# Patient Record
Sex: FEMALE | Race: ASIAN | NOT HISPANIC OR LATINO
[De-identification: names, ages, dates, MRNs, and addresses within clinical notes are randomized per-mention and may not be internally consistent; named-entity substitution may affect disease eponyms.]

---

## 2017-11-21 PROCEDURE — 87517 HEPATITIS B DNA QUANT: CPT | Performed by: INTERNAL MEDICINE

## 2017-11-24 PROCEDURE — 87177 OVA AND PARASITES SMEARS: CPT | Performed by: INTERNAL MEDICINE

## 2017-11-24 PROCEDURE — 87272 CRYPTOSPORIDIUM AG IF: CPT | Performed by: INTERNAL MEDICINE

## 2017-11-24 PROCEDURE — 87045 FECES CULTURE AEROBIC BACT: CPT | Performed by: INTERNAL MEDICINE

## 2017-11-24 PROCEDURE — 87209 SMEAR COMPLEX STAIN: CPT | Performed by: INTERNAL MEDICINE

## 2017-11-24 PROCEDURE — 87046 STOOL CULTR AEROBIC BACT EA: CPT | Performed by: INTERNAL MEDICINE

## 2017-11-24 PROCEDURE — 87329 GIARDIA AG IA: CPT | Performed by: INTERNAL MEDICINE

## 2017-11-24 PROCEDURE — 87427 SHIGA-LIKE TOXIN AG IA: CPT | Performed by: INTERNAL MEDICINE

## 2017-12-15 ENCOUNTER — BH HISTORICAL (OUTPATIENT)
Dept: OTHER | Age: 82
End: 2017-12-15

## 2018-01-11 ENCOUNTER — BH HISTORICAL (OUTPATIENT)
Dept: OTHER | Age: 83
End: 2018-01-11

## 2018-01-30 ENCOUNTER — HOSPITAL (OUTPATIENT)
Dept: OTHER | Age: 83
End: 2018-01-30
Attending: INTERNAL MEDICINE

## 2018-02-10 ENCOUNTER — BH HISTORICAL (OUTPATIENT)
Dept: OTHER | Age: 83
End: 2018-02-10

## 2018-02-21 ENCOUNTER — BH HISTORICAL (OUTPATIENT)
Dept: OTHER | Age: 83
End: 2018-02-21

## 2018-05-07 ENCOUNTER — BH HISTORICAL (OUTPATIENT)
Dept: OTHER | Age: 83
End: 2018-05-07

## 2018-05-10 ENCOUNTER — BH HISTORICAL (OUTPATIENT)
Dept: OTHER | Age: 83
End: 2018-05-10

## 2018-07-02 ENCOUNTER — BH HISTORICAL (OUTPATIENT)
Dept: OTHER | Age: 83
End: 2018-07-02

## 2018-08-03 ENCOUNTER — DIAGNOSTIC TRANS (OUTPATIENT)
Dept: OTHER | Age: 83
End: 2018-08-03

## 2018-08-03 ENCOUNTER — HOSPITAL (OUTPATIENT)
Dept: OTHER | Age: 83
End: 2018-08-03
Attending: HOSPITALIST

## 2018-08-03 ENCOUNTER — HOSPITAL ENCOUNTER (INPATIENT)
Facility: HOSPITAL | Age: 83
LOS: 4 days | Discharge: NURSING, REHAB OR LONG-TERM CARE | DRG: 470 | End: 2018-08-07
Attending: HOSPITALIST | Admitting: HOSPITALIST
Payer: MEDICARE

## 2018-08-03 DIAGNOSIS — S72.009A HIP FRACTURE (HCC): ICD-10-CM

## 2018-08-03 LAB
AMORPH SED URNS QL MICRO: ABNORMAL
ANALYZER ANC (IANC): NORMAL
ANION GAP SERPL CALC-SCNC: 15 MMOL/L (ref 10–20)
APPEARANCE UR: CLEAR
APTT PPP: 24 SECONDS (ref 22–30)
APTT PPP: NORMAL S
BACTERIA #/AREA URNS HPF: ABNORMAL /HPF
BASOPHILS # BLD: 0 THOUSAND/MCL (ref 0–0.3)
BASOPHILS NFR BLD: 0 %
BILIRUB UR QL: NEGATIVE
BUN SERPL-MCNC: 17 MG/DL (ref 6–20)
BUN/CREAT SERPL: 20 (ref 7–25)
CALCIUM SERPL-MCNC: 8.5 MG/DL (ref 8.4–10.2)
CAOX CRY URNS QL MICRO: ABNORMAL
CHLORIDE: 105 MMOL/L (ref 98–107)
CO2 SERPL-SCNC: 25 MMOL/L (ref 21–32)
COLOR UR: YELLOW
CREAT SERPL-MCNC: 0.87 MG/DL (ref 0.51–0.95)
DIFFERENTIAL METHOD BLD: NORMAL
EOSINOPHIL # BLD: 0.1 THOUSAND/MCL (ref 0.1–0.5)
EOSINOPHIL NFR BLD: 1 %
EPITH CASTS #/AREA URNS LPF: ABNORMAL /[LPF]
ERYTHROCYTE [DISTWIDTH] IN BLOOD: 13.1 % (ref 11–15)
FATTY CASTS #/AREA URNS LPF: ABNORMAL /[LPF]
GLUCOSE SERPL-MCNC: 106 MG/DL (ref 65–99)
GLUCOSE UR-MCNC: NEGATIVE MG/DL
GRAN CASTS #/AREA URNS LPF: ABNORMAL /[LPF]
HEMATOCRIT: 44.2 % (ref 36–46.5)
HGB BLD-MCNC: 14.9 GM/DL (ref 12–15.5)
HGB UR QL: NEGATIVE
HYALINE CASTS #/AREA URNS LPF: ABNORMAL /LPF (ref 0–5)
INR PPP: 1
KETONES UR-MCNC: NEGATIVE MG/DL
LEUKOCYTE ESTERASE UR QL STRIP: NEGATIVE
LYMPHOCYTES # BLD: 1.2 THOUSAND/MCL (ref 1–4)
LYMPHOCYTES NFR BLD: 13 %
MAGNESIUM SERPL-MCNC: 2.2 MG/DL (ref 1.7–2.4)
MCH RBC QN AUTO: 30.3 PG (ref 26–34)
MCHC RBC AUTO-ENTMCNC: 33.7 GM/DL (ref 32–36.5)
MCV RBC AUTO: 90 FL (ref 78–100)
MIXED CELL CASTS #/AREA URNS LPF: ABNORMAL /[LPF]
MONOCYTES # BLD: 0.5 THOUSAND/MCL (ref 0.3–0.9)
MONOCYTES NFR BLD: 5 %
MUCOUS THREADS URNS QL MICRO: PRESENT
NEUTROPHILS # BLD: 7.6 THOUSAND/MCL (ref 1.8–7.7)
NEUTROPHILS NFR BLD: 81 %
NEUTS SEG NFR BLD: NORMAL %
NITRITE UR QL: POSITIVE
NRBC (NRBCRE): NORMAL
PH UR: 7 UNIT (ref 5–7)
PLATELET # BLD: 204 THOUSAND/MCL (ref 140–450)
POTASSIUM SERPL-SCNC: 3.4 MMOL/L (ref 3.4–5.1)
PROT UR QL: NEGATIVE MG/DL
PROTHROMBIN TIME: 10 SECONDS (ref 9.7–11.8)
PROTHROMBIN TIME: NORMAL
RBC # BLD: 4.91 MILLION/MCL (ref 4–5.2)
RBC #/AREA URNS HPF: ABNORMAL /HPF (ref 0–3)
RBC CASTS #/AREA URNS LPF: ABNORMAL /[LPF]
RENAL EPI CELLS #/AREA URNS HPF: ABNORMAL /[HPF]
SODIUM SERPL-SCNC: 142 MMOL/L (ref 135–145)
SP GR UR: 1.01 (ref 1–1.03)
SPECIMEN SOURCE: ABNORMAL
SPERM URNS QL MICRO: ABNORMAL
SQUAMOUS #/AREA URNS HPF: ABNORMAL /HPF (ref 0–5)
T VAGINALIS URNS QL MICRO: ABNORMAL
TRI-PHOS CRY URNS QL MICRO: ABNORMAL
URATE CRY URNS QL MICRO: ABNORMAL
URINE REFLEX: ABNORMAL
URNS CMNT MICRO: ABNORMAL
UROBILINOGEN UR QL: 0.2 MG/DL (ref 0–1)
WAXY CASTS #/AREA URNS LPF: ABNORMAL /[LPF]
WBC # BLD: 9.4 THOUSAND/MCL (ref 4.2–11)
WBC #/AREA URNS HPF: ABNORMAL /HPF (ref 0–5)
WBC CASTS #/AREA URNS LPF: ABNORMAL /[LPF]
YEAST HYPHAE URNS QL MICRO: ABNORMAL
YEAST URNS QL MICRO: ABNORMAL

## 2018-08-03 RX ORDER — METOCLOPRAMIDE HYDROCHLORIDE 5 MG/ML
10 INJECTION INTRAMUSCULAR; INTRAVENOUS EVERY 8 HOURS PRN
Status: DISCONTINUED | OUTPATIENT
Start: 2018-08-03 | End: 2018-08-06

## 2018-08-03 RX ORDER — MORPHINE SULFATE 4 MG/ML
4 INJECTION, SOLUTION INTRAMUSCULAR; INTRAVENOUS EVERY 2 HOUR PRN
Status: DISCONTINUED | OUTPATIENT
Start: 2018-08-03 | End: 2018-08-06

## 2018-08-03 RX ORDER — ACETAMINOPHEN 325 MG/1
650 TABLET ORAL EVERY 4 HOURS PRN
Status: DISCONTINUED | OUTPATIENT
Start: 2018-08-03 | End: 2018-08-06

## 2018-08-03 RX ORDER — HYDROCODONE BITARTRATE AND ACETAMINOPHEN 5; 325 MG/1; MG/1
1 TABLET ORAL EVERY 4 HOURS PRN
Status: DISCONTINUED | OUTPATIENT
Start: 2018-08-03 | End: 2018-08-06

## 2018-08-03 RX ORDER — AMLODIPINE BESYLATE AND BENAZEPRIL HYDROCHLORIDE 5; 20 MG/1; MG/1
1 CAPSULE ORAL
Status: DISCONTINUED | OUTPATIENT
Start: 2018-08-04 | End: 2018-08-03 | Stop reason: SDUPTHER

## 2018-08-03 RX ORDER — ENTECAVIR 0.5 MG/1
0.5 TABLET, FILM COATED ORAL
Status: DISCONTINUED | OUTPATIENT
Start: 2018-08-04 | End: 2018-08-07

## 2018-08-03 RX ORDER — SODIUM CHLORIDE 9 MG/ML
INJECTION, SOLUTION INTRAVENOUS CONTINUOUS
Status: DISCONTINUED | OUTPATIENT
Start: 2018-08-03 | End: 2018-08-07

## 2018-08-03 RX ORDER — BISACODYL 10 MG
10 SUPPOSITORY, RECTAL RECTAL
Status: DISCONTINUED | OUTPATIENT
Start: 2018-08-03 | End: 2018-08-07

## 2018-08-03 RX ORDER — HYDROCODONE BITARTRATE AND ACETAMINOPHEN 5; 325 MG/1; MG/1
2 TABLET ORAL EVERY 4 HOURS PRN
Status: DISCONTINUED | OUTPATIENT
Start: 2018-08-03 | End: 2018-08-06

## 2018-08-03 RX ORDER — SODIUM PHOSPHATE, DIBASIC AND SODIUM PHOSPHATE, MONOBASIC 7; 19 G/133ML; G/133ML
1 ENEMA RECTAL ONCE AS NEEDED
Status: DISCONTINUED | OUTPATIENT
Start: 2018-08-03 | End: 2018-08-07

## 2018-08-03 RX ORDER — OLANZAPINE 5 MG/1
5 TABLET ORAL NIGHTLY
Status: DISCONTINUED | OUTPATIENT
Start: 2018-08-03 | End: 2018-08-07

## 2018-08-03 RX ORDER — POLYETHYLENE GLYCOL 3350 17 G/17G
17 POWDER, FOR SOLUTION ORAL DAILY PRN
Status: DISCONTINUED | OUTPATIENT
Start: 2018-08-03 | End: 2018-08-07

## 2018-08-03 RX ORDER — DOCUSATE SODIUM 100 MG/1
100 CAPSULE, LIQUID FILLED ORAL 2 TIMES DAILY
Status: DISCONTINUED | OUTPATIENT
Start: 2018-08-03 | End: 2018-08-07

## 2018-08-03 RX ORDER — MORPHINE SULFATE 4 MG/ML
1 INJECTION, SOLUTION INTRAMUSCULAR; INTRAVENOUS EVERY 2 HOUR PRN
Status: DISCONTINUED | OUTPATIENT
Start: 2018-08-03 | End: 2018-08-06

## 2018-08-03 RX ORDER — OMEPRAZOLE 20 MG/1
20 CAPSULE, DELAYED RELEASE ORAL
COMMUNITY
End: 2019-01-16

## 2018-08-03 RX ORDER — OLANZAPINE 5 MG/1
5 TABLET ORAL NIGHTLY
COMMUNITY
End: 2019-01-16 | Stop reason: ALTCHOICE

## 2018-08-03 RX ORDER — ONDANSETRON 2 MG/ML
4 INJECTION INTRAMUSCULAR; INTRAVENOUS EVERY 6 HOURS PRN
Status: DISCONTINUED | OUTPATIENT
Start: 2018-08-03 | End: 2018-08-07

## 2018-08-03 RX ORDER — MORPHINE SULFATE 4 MG/ML
2 INJECTION, SOLUTION INTRAMUSCULAR; INTRAVENOUS EVERY 2 HOUR PRN
Status: DISCONTINUED | OUTPATIENT
Start: 2018-08-03 | End: 2018-08-06

## 2018-08-04 ENCOUNTER — ANESTHESIA EVENT (OUTPATIENT)
Dept: SURGERY | Facility: HOSPITAL | Age: 83
DRG: 470 | End: 2018-08-04
Payer: MEDICARE

## 2018-08-04 ENCOUNTER — APPOINTMENT (OUTPATIENT)
Dept: GENERAL RADIOLOGY | Facility: HOSPITAL | Age: 83
DRG: 470 | End: 2018-08-04
Attending: NURSE PRACTITIONER
Payer: MEDICARE

## 2018-08-04 LAB
ANION GAP SERPL CALC-SCNC: 8 MMOL/L (ref 0–18)
ANTIBODY SCREEN: NEGATIVE
BASOPHILS # BLD AUTO: 0.05 X10(3) UL (ref 0–0.1)
BASOPHILS NFR BLD AUTO: 0.4 %
BUN BLD-MCNC: 27 MG/DL (ref 8–20)
BUN/CREAT SERPL: 25.5 (ref 10–20)
CALCIUM BLD-MCNC: 7.9 MG/DL (ref 8.3–10.3)
CHLORIDE SERPL-SCNC: 107 MMOL/L (ref 101–111)
CO2 SERPL-SCNC: 23 MMOL/L (ref 22–32)
CREAT BLD-MCNC: 1.06 MG/DL (ref 0.55–1.02)
EOSINOPHIL # BLD AUTO: 0.11 X10(3) UL (ref 0–0.3)
EOSINOPHIL NFR BLD AUTO: 0.9 %
ERYTHROCYTE [DISTWIDTH] IN BLOOD BY AUTOMATED COUNT: 12.8 % (ref 11.5–16)
GLUCOSE BLD-MCNC: 103 MG/DL (ref 65–99)
GLUCOSE BLD-MCNC: 105 MG/DL (ref 65–99)
GLUCOSE BLD-MCNC: 107 MG/DL (ref 70–99)
GLUCOSE BLD-MCNC: 110 MG/DL (ref 65–99)
GLUCOSE BLD-MCNC: 143 MG/DL (ref 65–99)
HCT VFR BLD AUTO: 41.2 % (ref 34–50)
HGB BLD-MCNC: 13.4 G/DL (ref 12–16)
IMMATURE GRANULOCYTE COUNT: 0.05 X10(3) UL (ref 0–1)
IMMATURE GRANULOCYTE RATIO %: 0.4 %
LYMPHOCYTES # BLD AUTO: 0.92 X10(3) UL (ref 0.9–4)
LYMPHOCYTES NFR BLD AUTO: 7.2 %
MCH RBC QN AUTO: 29.4 PG (ref 27–33.2)
MCHC RBC AUTO-ENTMCNC: 32.5 G/DL (ref 31–37)
MCV RBC AUTO: 90.4 FL (ref 81–100)
MONOCYTES # BLD AUTO: 0.85 X10(3) UL (ref 0.1–1)
MONOCYTES NFR BLD AUTO: 6.6 %
NEUTROPHIL ABS PRELIM: 10.81 X10 (3) UL (ref 1.3–6.7)
NEUTROPHILS # BLD AUTO: 10.81 X10(3) UL (ref 1.3–6.7)
NEUTROPHILS NFR BLD AUTO: 84.5 %
OSMOLALITY SERPL CALC.SUM OF ELEC: 292 MOSM/KG (ref 275–295)
PLATELET # BLD AUTO: 169 10(3)UL (ref 150–450)
POTASSIUM SERPL-SCNC: 4.1 MMOL/L (ref 3.6–5.1)
RBC # BLD AUTO: 4.56 X10(6)UL (ref 3.8–5.1)
RED CELL DISTRIBUTION WIDTH-SD: 42.4 FL (ref 35.1–46.3)
RH BLOOD TYPE: POSITIVE
SODIUM SERPL-SCNC: 138 MMOL/L (ref 136–144)
WBC # BLD AUTO: 12.8 X10(3) UL (ref 4–13)

## 2018-08-04 PROCEDURE — 86900 BLOOD TYPING SEROLOGIC ABO: CPT | Performed by: ORTHOPAEDIC SURGERY

## 2018-08-04 PROCEDURE — 87081 CULTURE SCREEN ONLY: CPT | Performed by: HOSPITALIST

## 2018-08-04 PROCEDURE — 82962 GLUCOSE BLOOD TEST: CPT

## 2018-08-04 PROCEDURE — 73502 X-RAY EXAM HIP UNI 2-3 VIEWS: CPT | Performed by: NURSE PRACTITIONER

## 2018-08-04 PROCEDURE — 85025 COMPLETE CBC W/AUTO DIFF WBC: CPT | Performed by: HOSPITALIST

## 2018-08-04 PROCEDURE — 80048 BASIC METABOLIC PNL TOTAL CA: CPT | Performed by: HOSPITALIST

## 2018-08-04 PROCEDURE — 86901 BLOOD TYPING SEROLOGIC RH(D): CPT | Performed by: ORTHOPAEDIC SURGERY

## 2018-08-04 PROCEDURE — 86850 RBC ANTIBODY SCREEN: CPT | Performed by: ORTHOPAEDIC SURGERY

## 2018-08-04 RX ORDER — HEPARIN SODIUM 5000 [USP'U]/ML
5000 INJECTION, SOLUTION INTRAVENOUS; SUBCUTANEOUS EVERY 8 HOURS
Status: COMPLETED | OUTPATIENT
Start: 2018-08-04 | End: 2018-08-04

## 2018-08-04 RX ORDER — MORPHINE SULFATE 4 MG/ML
2 INJECTION, SOLUTION INTRAMUSCULAR; INTRAVENOUS EVERY 2 HOUR PRN
Status: DISCONTINUED | OUTPATIENT
Start: 2018-08-04 | End: 2018-08-04

## 2018-08-04 RX ORDER — MORPHINE SULFATE 4 MG/ML
4 INJECTION, SOLUTION INTRAMUSCULAR; INTRAVENOUS EVERY 2 HOUR PRN
Status: DISCONTINUED | OUTPATIENT
Start: 2018-08-04 | End: 2018-08-04

## 2018-08-04 RX ORDER — CEFAZOLIN SODIUM/WATER 2 G/20 ML
2 SYRINGE (ML) INTRAVENOUS
Status: DISPENSED | OUTPATIENT
Start: 2018-08-05 | End: 2018-08-06

## 2018-08-04 NOTE — CERTIFICATION
**Certification    PHYSICIAN Certification of Need for Inpatient Hospitalization    Based on the her current state of illness, Callie Savage requires inpatient hospitalization for her fracture, need orthopedic surgery

## 2018-08-04 NOTE — PLAN OF CARE
Informed by Dr. Kailee Palacios that patient has a displaced femoral neck fracture.   Will schedule for right bipolar hip replacement for 8/5/18

## 2018-08-04 NOTE — PLAN OF CARE
Altered Communication/Language Barrier    • Patient/Family is able to understand and participate in their care Progressing        PAIN - ADULT    • Verbalizes/displays adequate comfort level or patient's stated pain goal Progressing        Patient/Family G

## 2018-08-04 NOTE — CM/SW NOTE
vijaya attempted to reach dtr [phone] ting to discuss dc planning.  [place] airport and will call this worker back in 20 min

## 2018-08-04 NOTE — PAYOR COMM NOTE
--------------  ADMISSION REVIEW     Payor: [address] #:  [id number]  Authorization Number: N/A    Admit date: 8/3/18  Admit time: 2120       Admitting Physician: Melody Prakash MD  Attending Physician:  Melody Prakash MD  Primary C surgery  3. Hemiarthroplasty right hip, 8/5/18, patient consented for surgery  4. Continue pain management  5.  NPO after midnight tonight        MEDICATIONS ADMINISTERED IN LAST 1 DAY:  AmLODIPine Besylate (NORVASC) 5 mg, lisinopril (PRINIVIL,ZESTRIL) 20 m

## 2018-08-04 NOTE — CM/SW NOTE
08/04/18 1500   CM/SW Referral Data   Referral Source Physician   Reason for Referral Discharge planning   Informant Children   Patient Info   Patient's Home Environment Senior Independent Housing   Number of Levels in Home 1   Patient lives with Alone

## 2018-08-04 NOTE — PLAN OF CARE
NURSING ADMISSION NOTE      Patient admitted via cart from [facility] ER. Oriented to room. Safety precautions initiated. Bed in low position. Call light in reach. Daughter-in-law accompanying patient.

## 2018-08-04 NOTE — ANESTHESIA PREPROCEDURE EVALUATION
PRE-OP EVALUATION    Patient Name: Francesca Andrade    Pre-op Diagnosis: Hip fracture ([address] Branden Kenny    Procedure(s):  HIP HEMIARTHROPLASTY/ BIPOLAR, LEFT    Surgeon(s) and Role:     Ginger Miller MD - Primary    Pre-op vitals reviewed.   Temp: 98.2 °F (36 Medications:    omeprazole 20 MG Oral Capsule Delayed Release Take 20 mg by mouth daily as needed. Disp:  Rfl:    Sertraline HCl 50 MG Oral Tab Take 50 mg by mouth daily. Disp:  Rfl:    OLANZapine 5 MG Oral Tab Take 5 mg by mouth nightly.  Disp:  Rfl:    En 08/04/2018   HCT 41.2 08/04/2018   MCV 90.4 08/04/2018   MCH 29.4 08/04/2018   MCHC 32.5 08/04/2018   RDW 12.8 08/04/2018   .0 08/04/2018       Lab Results  Component Value Date    08/04/2018   K 4.1 08/04/2018    08/04/2018   CO2 23.0 0

## 2018-08-04 NOTE — H&P
[facility] hospitalist H+P  PCP Ivon Vargas MD  CC fracture  HPI 79 yo female with multiple medical problems including but not limited to hx  anxiety, CKD, HTN, hepatitis,  Dementia, psychosis here as a transfer from [facility]. Patient had a fall,  rep [address]  1/6/2016: [facility] CATARACT EXTRACAP,INSERT LENS Right      Comment: Procedure: RIGHT PHACOEMULSIFICATION OF                CATARACT WITH INTRAOCULAR LENS IMPLANT 77940;                 Surgeon:  Rob Hope MD;  [address]  BUN 27, creatinine 1.06  CT head or brain wo con:  No acute intracranial hemorrhage or skull fracture.  Moderate chronic microvascular ischemic changes    CT cervical spine no acute fracture or subluxation    CT lumbar spine no acute osseous abnormalit

## 2018-08-04 NOTE — CONSULTS
[facility]  Orthopedic Surgery  Report of Consultation    Katelyn Duran Patient Status:  Inpatient    10/27/1934 MRN [id number]   [facility] 3SW-A Attending Austin Luna MD   Hosp Day # 1 PCP Dylon Becker MD     Reason for [facility] [address]  No family history on file. reports that she has never smoked. She has never used smokeless tobacco. She reports that she does not drink alcohol.     Allergies:    Apples                      Comment:Itchy lips  Sulfa Antibiotics mg, 650 mg, Oral, Q4H PRN **OR** HYDROcodone-acetaminophen (NORCO) 5-325 MG per tab 1 tablet, 1 tablet, Oral, Q4H PRN **OR** HYDROcodone-acetaminophen (NORCO) 5-325 MG per tab 2 tablet, 2 tablet, Oral, Q4H PRN  •  morphINE sulfate (PF) 4 MG/ML injection 1 MCHC  32.5   RDW  12.8   NEPRELIM  10.81*   WBC  12.8   PLT  169.0      No results for input(s): PTP, INR in the last 168 hours. Diagnostics:  No x-rays are available to evaluate. Initial report from [facility] is displaced femoral neck fracture right.     I

## 2018-08-05 ENCOUNTER — ANESTHESIA (OUTPATIENT)
Dept: SURGERY | Facility: HOSPITAL | Age: 83
DRG: 470 | End: 2018-08-05
Payer: MEDICARE

## 2018-08-05 ENCOUNTER — APPOINTMENT (OUTPATIENT)
Dept: GENERAL RADIOLOGY | Facility: HOSPITAL | Age: 83
DRG: 470 | End: 2018-08-05
Attending: ORTHOPAEDIC SURGERY
Payer: MEDICARE

## 2018-08-05 ENCOUNTER — SURGERY (OUTPATIENT)
Age: 83
End: 2018-08-05

## 2018-08-05 LAB
ANION GAP SERPL CALC-SCNC: 7 MMOL/L (ref 0–18)
BASOPHILS # BLD AUTO: 0.05 X10(3) UL (ref 0–0.1)
BASOPHILS NFR BLD AUTO: 0.6 %
BUN BLD-MCNC: 18 MG/DL (ref 8–20)
BUN/CREAT SERPL: 20.9 (ref 10–20)
CALCIUM BLD-MCNC: 7.6 MG/DL (ref 8.3–10.3)
CHLORIDE SERPL-SCNC: 112 MMOL/L (ref 101–111)
CO2 SERPL-SCNC: 22 MMOL/L (ref 22–32)
CREAT BLD-MCNC: 0.86 MG/DL (ref 0.55–1.02)
EOSINOPHIL # BLD AUTO: 0.39 X10(3) UL (ref 0–0.3)
EOSINOPHIL NFR BLD AUTO: 4.3 %
ERYTHROCYTE [DISTWIDTH] IN BLOOD BY AUTOMATED COUNT: 12.9 % (ref 11.5–16)
GLUCOSE BLD-MCNC: 101 MG/DL (ref 65–99)
GLUCOSE BLD-MCNC: 101 MG/DL (ref 65–99)
GLUCOSE BLD-MCNC: 87 MG/DL (ref 65–99)
GLUCOSE BLD-MCNC: 94 MG/DL (ref 65–99)
GLUCOSE BLD-MCNC: 96 MG/DL (ref 70–99)
HCT VFR BLD AUTO: 38.8 % (ref 34–50)
HGB BLD-MCNC: 13 G/DL (ref 12–16)
IMMATURE GRANULOCYTE COUNT: 0.05 X10(3) UL (ref 0–1)
IMMATURE GRANULOCYTE RATIO %: 0.6 %
LYMPHOCYTES # BLD AUTO: 1.2 X10(3) UL (ref 0.9–4)
LYMPHOCYTES NFR BLD AUTO: 13.2 %
MCH RBC QN AUTO: 29.7 PG (ref 27–33.2)
MCHC RBC AUTO-ENTMCNC: 33.5 G/DL (ref 31–37)
MCV RBC AUTO: 88.8 FL (ref 81–100)
MONOCYTES # BLD AUTO: 0.94 X10(3) UL (ref 0.1–1)
MONOCYTES NFR BLD AUTO: 10.3 %
NEUTROPHIL ABS PRELIM: 6.46 X10 (3) UL (ref 1.3–6.7)
NEUTROPHILS # BLD AUTO: 6.46 X10(3) UL (ref 1.3–6.7)
NEUTROPHILS NFR BLD AUTO: 71 %
OSMOLALITY SERPL CALC.SUM OF ELEC: 294 MOSM/KG (ref 275–295)
PLATELET # BLD AUTO: 150 10(3)UL (ref 150–450)
POTASSIUM SERPL-SCNC: 3.7 MMOL/L (ref 3.6–5.1)
RBC # BLD AUTO: 4.37 X10(6)UL (ref 3.8–5.1)
RED CELL DISTRIBUTION WIDTH-SD: 41.8 FL (ref 35.1–46.3)
SODIUM SERPL-SCNC: 141 MMOL/L (ref 136–144)
WBC # BLD AUTO: 9.1 X10(3) UL (ref 4–13)

## 2018-08-05 PROCEDURE — 82962 GLUCOSE BLOOD TEST: CPT

## 2018-08-05 PROCEDURE — 76942 ECHO GUIDE FOR BIOPSY: CPT | Performed by: ORTHOPAEDIC SURGERY

## 2018-08-05 PROCEDURE — 88311 DECALCIFY TISSUE: CPT | Performed by: ORTHOPAEDIC SURGERY

## 2018-08-05 PROCEDURE — 88305 TISSUE EXAM BY PATHOLOGIST: CPT | Performed by: ORTHOPAEDIC SURGERY

## 2018-08-05 PROCEDURE — 73501 X-RAY EXAM HIP UNI 1 VIEW: CPT | Performed by: ORTHOPAEDIC SURGERY

## 2018-08-05 PROCEDURE — 0SRS0J9 REPLACEMENT OF LEFT HIP JOINT, FEMORAL SURFACE WITH SYNTHETIC SUBSTITUTE, CEMENTED, OPEN APPROACH: ICD-10-PCS | Performed by: ORTHOPAEDIC SURGERY

## 2018-08-05 PROCEDURE — 3E0T3BZ INTRODUCTION OF ANESTHETIC AGENT INTO PERIPHERAL NERVES AND PLEXI, PERCUTANEOUS APPROACH: ICD-10-PCS | Performed by: ANESTHESIOLOGY

## 2018-08-05 PROCEDURE — 85025 COMPLETE CBC W/AUTO DIFF WBC: CPT | Performed by: HOSPITALIST

## 2018-08-05 PROCEDURE — 80048 BASIC METABOLIC PNL TOTAL CA: CPT | Performed by: HOSPITALIST

## 2018-08-05 DEVICE — KIT FEM BONE CEMENT RESTRICTOR: Type: IMPLANTABLE DEVICE | Status: FUNCTIONAL

## 2018-08-05 DEVICE — IMPLANTABLE DEVICE: Type: IMPLANTABLE DEVICE | Site: HIP | Status: FUNCTIONAL

## 2018-08-05 DEVICE — VERSYS CEM LD/FX SZ 12X125MM: Type: IMPLANTABLE DEVICE | Site: HIP | Status: FUNCTIONAL

## 2018-08-05 DEVICE — VERSYS DISTAL CENTRALIZER 12MM: Type: IMPLANTABLE DEVICE | Site: HIP | Status: FUNCTIONAL

## 2018-08-05 DEVICE — BIOMET BC R 1X40 US: Type: IMPLANTABLE DEVICE | Site: HIP | Status: FUNCTIONAL

## 2018-08-05 DEVICE — BIPOLAR LINER 42/43 ODX22MM: Type: IMPLANTABLE DEVICE | Site: HIP | Status: FUNCTIONAL

## 2018-08-05 RX ORDER — OXYCODONE HYDROCHLORIDE 5 MG/1
5 TABLET ORAL EVERY 4 HOURS PRN
Status: DISCONTINUED | OUTPATIENT
Start: 2018-08-05 | End: 2018-08-06

## 2018-08-05 RX ORDER — SODIUM CHLORIDE, SODIUM LACTATE, POTASSIUM CHLORIDE, CALCIUM CHLORIDE 600; 310; 30; 20 MG/100ML; MG/100ML; MG/100ML; MG/100ML
INJECTION, SOLUTION INTRAVENOUS CONTINUOUS
Status: DISCONTINUED | OUTPATIENT
Start: 2018-08-05 | End: 2018-08-05 | Stop reason: HOSPADM

## 2018-08-05 RX ORDER — NALOXONE HYDROCHLORIDE 0.4 MG/ML
80 INJECTION, SOLUTION INTRAMUSCULAR; INTRAVENOUS; SUBCUTANEOUS AS NEEDED
Status: DISCONTINUED | OUTPATIENT
Start: 2018-08-05 | End: 2018-08-05 | Stop reason: HOSPADM

## 2018-08-05 RX ORDER — TIZANIDINE 2 MG/1
2 TABLET ORAL 3 TIMES DAILY PRN
Status: DISCONTINUED | OUTPATIENT
Start: 2018-08-05 | End: 2018-08-06

## 2018-08-05 RX ORDER — TRAMADOL HYDROCHLORIDE 50 MG/1
50 TABLET ORAL EVERY 12 HOURS
Status: DISCONTINUED | OUTPATIENT
Start: 2018-08-05 | End: 2018-08-06

## 2018-08-05 RX ORDER — OXYCODONE HYDROCHLORIDE 5 MG/1
2.5 TABLET ORAL EVERY 4 HOURS PRN
Status: DISCONTINUED | OUTPATIENT
Start: 2018-08-05 | End: 2018-08-06

## 2018-08-05 RX ORDER — ACETAMINOPHEN 325 MG/1
650 TABLET ORAL 4 TIMES DAILY
Status: DISCONTINUED | OUTPATIENT
Start: 2018-08-05 | End: 2018-08-07

## 2018-08-05 RX ORDER — ACETAMINOPHEN 325 MG/1
TABLET ORAL
Status: COMPLETED
Start: 2018-08-05 | End: 2018-08-05

## 2018-08-05 RX ORDER — OXYCODONE HYDROCHLORIDE 10 MG/1
10 TABLET ORAL EVERY 4 HOURS PRN
Status: DISCONTINUED | OUTPATIENT
Start: 2018-08-05 | End: 2018-08-06

## 2018-08-05 RX ORDER — ONDANSETRON 2 MG/ML
INJECTION INTRAMUSCULAR; INTRAVENOUS
Status: DISCONTINUED | OUTPATIENT
Start: 2018-08-05 | End: 2018-08-05 | Stop reason: HOSPADM

## 2018-08-05 RX ORDER — CEFAZOLIN SODIUM 1 G/3ML
INJECTION, POWDER, FOR SOLUTION INTRAMUSCULAR; INTRAVENOUS
Status: DISCONTINUED | OUTPATIENT
Start: 2018-08-05 | End: 2018-08-05 | Stop reason: HOSPADM

## 2018-08-05 RX ORDER — ONDANSETRON 2 MG/ML
4 INJECTION INTRAMUSCULAR; INTRAVENOUS AS NEEDED
Status: DISCONTINUED | OUTPATIENT
Start: 2018-08-05 | End: 2018-08-05 | Stop reason: HOSPADM

## 2018-08-05 RX ORDER — POTASSIUM CHLORIDE 14.9 MG/ML
20 INJECTION INTRAVENOUS ONCE
Status: COMPLETED | OUTPATIENT
Start: 2018-08-05 | End: 2018-08-05

## 2018-08-05 RX ORDER — MORPHINE SULFATE 4 MG/ML
1 INJECTION, SOLUTION INTRAMUSCULAR; INTRAVENOUS EVERY 2 HOUR PRN
Status: DISCONTINUED | OUTPATIENT
Start: 2018-08-05 | End: 2018-08-06

## 2018-08-05 RX ORDER — MORPHINE SULFATE 4 MG/ML
2 INJECTION, SOLUTION INTRAMUSCULAR; INTRAVENOUS EVERY 2 HOUR PRN
Status: DISCONTINUED | OUTPATIENT
Start: 2018-08-05 | End: 2018-08-06

## 2018-08-05 RX ORDER — MORPHINE SULFATE 4 MG/ML
2 INJECTION, SOLUTION INTRAMUSCULAR; INTRAVENOUS EVERY 5 MIN PRN
Status: DISCONTINUED | OUTPATIENT
Start: 2018-08-05 | End: 2018-08-05 | Stop reason: HOSPADM

## 2018-08-05 RX ORDER — ACETAMINOPHEN 325 MG/1
650 TABLET ORAL ONCE
Status: COMPLETED | OUTPATIENT
Start: 2018-08-05 | End: 2018-08-05

## 2018-08-05 RX ORDER — MORPHINE SULFATE 4 MG/ML
1.5 INJECTION, SOLUTION INTRAMUSCULAR; INTRAVENOUS EVERY 2 HOUR PRN
Status: DISCONTINUED | OUTPATIENT
Start: 2018-08-05 | End: 2018-08-06

## 2018-08-05 RX ORDER — MIDAZOLAM HYDROCHLORIDE 1 MG/ML
1 INJECTION INTRAMUSCULAR; INTRAVENOUS EVERY 5 MIN PRN
Status: DISCONTINUED | OUTPATIENT
Start: 2018-08-05 | End: 2018-08-05 | Stop reason: HOSPADM

## 2018-08-05 NOTE — PROGRESS NOTES
Patient received post op. Drowsy, arouses to Rn voice and answering son in [address] stating she is not hungry but thirsty. Repositioned for comfort. Discussed post op plan of care with son Cynthia Cortez at bedside. Vitals stable. IVF infusing as ordered.  Michael cat

## 2018-08-05 NOTE — ANESTHESIA POSTPROCEDURE EVALUATION
[facility] Patient Status:  Inpatient   Age/Gender 80year old female MRN [id number]   [facility] SURGERY Attending Mary Melgoza MD   Hosp Day # 2 PCP Pamela Gallardo MD       Anesthesia Post-op Note    Procedure(s):  HIP

## 2018-08-06 PROBLEM — Z47.89 ORTHOPEDIC AFTERCARE: Status: ACTIVE | Noted: 2018-08-06

## 2018-08-06 LAB
GLUCOSE BLD-MCNC: 100 MG/DL (ref 65–99)
GLUCOSE BLD-MCNC: 158 MG/DL (ref 65–99)
GLUCOSE BLD-MCNC: 92 MG/DL (ref 65–99)
POTASSIUM SERPL-SCNC: 3.9 MMOL/L (ref 3.6–5.1)

## 2018-08-06 PROCEDURE — 82962 GLUCOSE BLOOD TEST: CPT

## 2018-08-06 PROCEDURE — 97530 THERAPEUTIC ACTIVITIES: CPT

## 2018-08-06 PROCEDURE — 97165 OT EVAL LOW COMPLEX 30 MIN: CPT

## 2018-08-06 PROCEDURE — 97161 PT EVAL LOW COMPLEX 20 MIN: CPT

## 2018-08-06 PROCEDURE — 84132 ASSAY OF SERUM POTASSIUM: CPT | Performed by: INTERNAL MEDICINE

## 2018-08-06 RX ORDER — ASPIRIN 325 MG
325 TABLET ORAL 2 TIMES DAILY
Status: DISCONTINUED | OUTPATIENT
Start: 2018-08-06 | End: 2018-08-07

## 2018-08-06 RX ORDER — MORPHINE SULFATE 4 MG/ML
0.5 INJECTION, SOLUTION INTRAMUSCULAR; INTRAVENOUS EVERY 2 HOUR PRN
Status: DISCONTINUED | OUTPATIENT
Start: 2018-08-06 | End: 2018-08-06

## 2018-08-06 RX ORDER — HYDROCODONE BITARTRATE AND ACETAMINOPHEN 5; 325 MG/1; MG/1
0.5 TABLET ORAL EVERY 4 HOURS PRN
Status: DISCONTINUED | OUTPATIENT
Start: 2018-08-06 | End: 2018-08-06

## 2018-08-06 RX ORDER — METOCLOPRAMIDE HYDROCHLORIDE 5 MG/ML
5 INJECTION INTRAMUSCULAR; INTRAVENOUS EVERY 8 HOURS PRN
Status: DISCONTINUED | OUTPATIENT
Start: 2018-08-06 | End: 2018-08-07

## 2018-08-06 RX ORDER — HYDROCODONE BITARTRATE AND ACETAMINOPHEN 5; 325 MG/1; MG/1
0.5 TABLET ORAL EVERY 4 HOURS PRN
Status: DISCONTINUED | OUTPATIENT
Start: 2018-08-06 | End: 2018-08-07

## 2018-08-06 NOTE — PROGRESS NOTES
Pharmacy Note: Renal dose adjustment for Metoclopramide (Reglan)  Sienna Mcdonald has been prescribed Metoclopramide (Reglan) 10 mg every 8 hours as needed.     CrCl estimated at 35.6 ml/min    Her calculated creatinine clearance is < 40 mL/min, therefore, the

## 2018-08-06 NOTE — PROGRESS NOTES
[facility] Hospitalist Progress Note     PCP: Mana Jones MD    Chief Complaint: follow-up    Overnight/Interim Events:      SUBJECTIVE:  PT in OR holding area. No pain at rest, moving some pain.  No cardiac hx    OBJECTIVE:  Temp:  [97.8 °F (36.6 °C)-99.1 °F (37.3 100 mg Oral BID   • Entecavir  0.5 mg Oral Daily   • [MAR Hold] amLODIPine-lisinopril (LOTREL 5/20) combination tablet   Oral Daily   • [MAR Hold] Sertraline HCl  50 mg Oral Daily   • [MAR Hold] OLANZapine  5 mg Oral Nightly     • sodium chloride Stopped ( ordered     Preventative Heparin for now. Post orthopedic surgery DVT prophylaxis per orthopedic surgery    Dispo: surgery    Questions/concerns were discussed with patient and son/friend by bedside.    Reviewed chart including previous progress notes

## 2018-08-06 NOTE — OPERATIVE REPORT
[address]    PATIENT'S NAME: GIA ROGER   ATTENDING PHYSICIAN: Parish Aranda M.D. OPERATING PHYSICIAN: Medina Baker M.D.    PATIENT ACCOUNT#:   [de-identified]    LOCATION:  [address] [place] [address]  MEDICAL RECORD #:   [id number]       DATE OF BIRTH: posterior aspect of the greater trochanter, including the piriformis tendon. T-shaped incision was made in the capsule posteriorly. No hemarthrosis was present. The corners of the T-shaped incision were tagged with #5 Ethibond suture.   The femoral neck additional 2 mm of bone from the calcar of the proximal femur to try to adjust my limb length. Bone was prepared with pulsatile lavage, a brush, and a sponge.   A cement restrictor was placed down the medullary canal.  Biomet cement was mixed on the back t M.D.  d: 08/05/2018 18:52:02  t: 08/05/2018 19:27:40  [facility] [id number]  [facility]/    cc: Livier Granda M.D.

## 2018-08-06 NOTE — PLAN OF CARE
Patient/Family Goals    • Patient/Family Short Term Goal Completed          Altered Communication/Language Barrier    • Patient/Family is able to understand and participate in their care Progressing        Diabetes/Glucose Control    • Glucose maintained w

## 2018-08-06 NOTE — PROGRESS NOTES
Post Op Day 1 Ortho Note    Status Post Nerve Block:  Type of Nerve Block: Left Fascia Iliaca  Single Injection Nerve Block    Post op review: No evidence of immediate block related complications     Difficult to assess patient - she speaks Mandarin but ev

## 2018-08-06 NOTE — PHYSICAL THERAPY NOTE
PHYSICAL THERAPY EVALUATION - INPATIENT     Room Number: 373/373-A  Evaluation Date: 8/6/2018  Type of Evaluation: Initial  Physician Order: PT Eval and Treat    Presenting Problem: S/p Left Cemented Bipolar Hip Replacement on 08/05/18  Reason for Th [address]  1/6/2016: [facility] CATARACT EXTRACAP,INSERT LENS Right      Comment: Procedure: RIGHT PHACOEMULSIFICATION OF                CATARACT WITH INTRAOCULAR LENS IMPLANT 93695;                 Surgeon:  Haseeb Salazar MD;  Location:  Hip & knee ROM Limited S/p Left Cemented Bipolar Hip Replacement on 08/05/18  Lower extremity strength is within functional limits except for the following:  Unable to assess MMT due to inability to follow verbal commands for test + resistive to mobility. Sit to stand with RW required max assist of 2. Unable to complete stand with max assist of 2 due to resistive. Patient needed max assist of 2 to transfer to bedside chair on right side of bed. Son was present. RN & PCT were notified re: above & may need to u restoring supervision level of mobility & then consider moving into Assisted living. PLAN  PT Treatment Plan: Bed mobility; Endurance; Energy conservation;Patient education; Family education;Gait training;Neuromuscular re-educate;Strengthening;Transfer t

## 2018-08-06 NOTE — OCCUPATIONAL THERAPY NOTE
OCCUPATIONAL THERAPY EVALUATION - INPATIENT     Room Number: 373/373-A  Evaluation Date: 8/6/2018  Type of Evaluation: Initial  Presenting Problem: s/p L cemented bipolar hip replacement 8/5/18    Physician Order: IP Consult to Occupational Therapy  Reason [id number];                 Surgeon:  Tatyana Howard MD;  Location: [address] [place] [address] SITUATION  Type of Home: Independent living facility (@ [facility])  Home Layout: One level  Lives With: Alone (Staff on s AND STRENGTH ASSESSMENT  Upper extremity ROM is within functional limits     Upper extremity strength is within functional limits     COORDINATION  Gross Motor    WFL    Fine Motor    WFL      ADDITIONAL TESTS                                    NEUROLOGICA chair via max assist to control descent. Patient also educated on OT role, safety, fall prevention, pain management, discharge recommendations with good verbal understanding. Patient's son and his brother-in-law present throughout.   Patient End of Session: function.     Patient Complexity  Occupational Profile/Medical History LOW - Brief history including review of medical or therapy records    Specific performance deficits impacting engagement in ADL/IADL LOW  1 - 3 performance deficits    Client Assessment/

## 2018-08-06 NOTE — PAYOR COMM NOTE
--------------  ADMISSION REVIEW     Payor: [address] #:  [id number]  Authorization Number: N/A    Admit date: 8/3/18  Admit time: 2120       Admitting Physician: Madison Kennedy MD  Attending Physician:  MD Rajat Tim [facility])    • Depression    • Hearing impairment    • Hearing loss     left hearing aid   • High blood pressure    • Unspecified essential hypertension    • Viral hepatitis B without mention of hepatic coma, chronic, without mention of hepatitis delta HBeAb+ needed. Disp: 30 capsule Rfl: 0   aspirin 81 MG Oral Tab Take 1 tablet by mouth daily. Disp: 90 tablet Rfl: 3   Cholecalciferol (VITAMIN D) 1000 UNITS Oral Tab Take  by mouth.  Disp:  Rfl:      ROS 10 systems reviewed and negative except as in HPI  PE;   08 issues  Medication ordered  Follow up with PCP    Voice changes, OP erythema; check throat culture    Chronic hepatitis entecavir ordered    Preventative Heparin for now.  Post orthopedic surgery DVT prophylaxis per orthopedic surgery    Dr. Kevin Newby will iliaca block.     PROCEDURE:  Patient was brought to the operating room and placed on the operating room table in a supine position. General anesthesia was induced by Dr. Сергей Cooney. A fascia iliaca block was performed by Dr. Taylor Cia.   Left lower extremity osteotomized approximately 8 mm proximal to the lesser trochanter. The corkscrew extractor and a Abi clamp was used to try to rescue the femoral head from the acetabulum. Ultimately, I was able to pry it out with a small Mainor.   Femoral head was dale to fully harden. I then chose a 22 mm cobalt chromium head and impacted it on the trunnion of the femoral component. I did assemble my polyethylene insert over the 22 mm head and the acetabular shell over the polyethylene insert.   I allowed the cement to

## 2018-08-06 NOTE — PROGRESS NOTES
[facility] Hospitalist Progress Note     PCP: Shiela Birch MD    Chief Complaint: follow-up    Overnight/Interim Events:      SUBJECTIVE:  Pt laying in bed, some pain reports (difficulty c translation, RN recently talked to pt c ). SBP in 80-90s.  Some co hours.      Meds:     • aspirin  325 mg Oral BID   • acetaminophen  650 mg Oral QID   • cefTRIAXone  1 g Intravenous Q24H   • docusate sodium  100 mg Oral BID   • Entecavir  0.5 mg Oral Daily   • amLODIPine-lisinopril (LOTREL 5/20) combination tablet   Chris Medina PCP     Voice changes, OP erythema; check throat culture     Chronic hepatitis entecavir ordered     Accu-checks ordered but no hx DM noted, BG ok, will dc    Preventative asa 325mg BID per ortho    Dispo: PT, likely rehab on dc, lives by self    Questions

## 2018-08-06 NOTE — CM/SW NOTE
Met with pt's son, Ceola Paget, per his request.  He confirms KENDY choice of [facility] [phone]. SW discussed that PT/OT will eval- notes will be sent to above EKNDY. Insurance Neema Child will be submitted.   Once approved and pt medically cleared for d/c, wi

## 2018-08-07 VITALS
OXYGEN SATURATION: 98 % | TEMPERATURE: 99 F | RESPIRATION RATE: 20 BRPM | HEART RATE: 84 BPM | SYSTOLIC BLOOD PRESSURE: 144 MMHG | DIASTOLIC BLOOD PRESSURE: 67 MMHG

## 2018-08-07 LAB
ANION GAP SERPL CALC-SCNC: 7 MMOL/L (ref 0–18)
BUN BLD-MCNC: 17 MG/DL (ref 8–20)
BUN/CREAT SERPL: 22.7 (ref 10–20)
CALCIUM BLD-MCNC: 7.4 MG/DL (ref 8.3–10.3)
CHLORIDE SERPL-SCNC: 113 MMOL/L (ref 101–111)
CO2 SERPL-SCNC: 23 MMOL/L (ref 22–32)
CREAT BLD-MCNC: 0.75 MG/DL (ref 0.55–1.02)
ERYTHROCYTE [DISTWIDTH] IN BLOOD BY AUTOMATED COUNT: 13.1 % (ref 11.5–16)
GLUCOSE BLD-MCNC: 105 MG/DL (ref 70–99)
HAV IGM SER QL: 2.3 MG/DL (ref 1.8–2.5)
HCT VFR BLD AUTO: 30.5 % (ref 34–50)
HGB BLD-MCNC: 10.2 G/DL (ref 12–16)
MCH RBC QN AUTO: 30 PG (ref 27–33.2)
MCHC RBC AUTO-ENTMCNC: 33.4 G/DL (ref 31–37)
MCV RBC AUTO: 89.7 FL (ref 81–100)
OSMOLALITY SERPL CALC.SUM OF ELEC: 298 MOSM/KG (ref 275–295)
PLATELET # BLD AUTO: 112 10(3)UL (ref 150–450)
POTASSIUM SERPL-SCNC: 3.5 MMOL/L (ref 3.6–5.1)
POTASSIUM SERPL-SCNC: 4.3 MMOL/L (ref 3.6–5.1)
RBC # BLD AUTO: 3.4 X10(6)UL (ref 3.8–5.1)
RED CELL DISTRIBUTION WIDTH-SD: 42.9 FL (ref 35.1–46.3)
SODIUM SERPL-SCNC: 143 MMOL/L (ref 136–144)
WBC # BLD AUTO: 9 X10(3) UL (ref 4–13)

## 2018-08-07 PROCEDURE — 84132 ASSAY OF SERUM POTASSIUM: CPT | Performed by: INTERNAL MEDICINE

## 2018-08-07 PROCEDURE — 83735 ASSAY OF MAGNESIUM: CPT | Performed by: INTERNAL MEDICINE

## 2018-08-07 PROCEDURE — 80048 BASIC METABOLIC PNL TOTAL CA: CPT | Performed by: INTERNAL MEDICINE

## 2018-08-07 PROCEDURE — 97112 NEUROMUSCULAR REEDUCATION: CPT

## 2018-08-07 PROCEDURE — 97530 THERAPEUTIC ACTIVITIES: CPT

## 2018-08-07 PROCEDURE — 85027 COMPLETE CBC AUTOMATED: CPT | Performed by: INTERNAL MEDICINE

## 2018-08-07 RX ORDER — ASPIRIN 325 MG
325 TABLET ORAL 2 TIMES DAILY
Qty: 1 TABLET | Refills: 0 | Status: ON HOLD | COMMUNITY
Start: 2018-08-07 | End: 2020-08-20

## 2018-08-07 RX ORDER — CEPHALEXIN 500 MG/1
250 CAPSULE ORAL 3 TIMES DAILY
Qty: 12 CAPSULE | Refills: 0 | Status: ON HOLD | OUTPATIENT
Start: 2018-08-07 | End: 2018-08-11

## 2018-08-07 RX ORDER — HYDROCODONE BITARTRATE AND ACETAMINOPHEN 5; 325 MG/1; MG/1
0.5 TABLET ORAL EVERY 4 HOURS PRN
Qty: 15 TABLET | Refills: 0 | Status: ON HOLD | OUTPATIENT
Start: 2018-08-07 | End: 2018-08-11

## 2018-08-07 RX ORDER — POTASSIUM CHLORIDE 20 MEQ/1
40 TABLET, EXTENDED RELEASE ORAL EVERY 4 HOURS
Status: COMPLETED | OUTPATIENT
Start: 2018-08-07 | End: 2018-08-07

## 2018-08-07 RX ORDER — ACETAMINOPHEN 325 MG/1
650 TABLET ORAL 4 TIMES DAILY
Qty: 30 TABLET | Refills: 0 | Status: SHIPPED | OUTPATIENT
Start: 2018-08-07 | End: 2019-01-16 | Stop reason: ALTCHOICE

## 2018-08-07 NOTE — CM/SW NOTE
Cancelled [facility] for transport, spoke with Luis Alfredo Verma, crew is already here and had to send them away as pt has MMAI and must use [facility] for transport. Unit SW completed [facility] forms and contacted [employer]. Await notification of provider.      Son is here and awar

## 2018-08-07 NOTE — PAYOR COMM NOTE
--------------  CONTINUED STAY REVIEW    Payor: [facility]  Subscriber #:  [id number]  Authorization Number: N/A    Admit date: 8/3/18  Admit time: 2120    Admitting Physician: Ricardo Avina MD  Attending Physician:  Teri Morales MD  Pr            Recent Labs   Lab  08/05/18   1822  08/05/18   2115  08/06/18   0720  08/06/18   1218  08/06/18   1419   PGLU  101*  101*  92  158*  100*         No results for input(s): TROP in the last 168 hours.        Meds:      • aspirin  325 mg Oral BID 08/07/18 0744 98.6 °F (37 °C) 77 20 110/60 96 % -- Nasal cannula 2 L/min AY   08/07/18 0330 98.1 °F (36.7 °C) 75 18 94/59 98 % -- Nasal cannula 2 L/min MG   08/06/18 2330 98.2 °F (36.8 °C) 76 17 113/51 96 % -- Nasal cannula 2 L/min MG   08/06/18 1930 98.2 8/6/2018 2100 Given 325 mg Oral Cristy Abraham RN    8/6/2018 1428 Given 325 mg Oral Juventino Mccullough, GALO      CefTRIAXone Sodium (ROCEPHIN) 1 g in sodium chloride 0.9 % 100 mL MBP/ADD-vantage     Date Action Dose Route User    8/6/2018 1425 New Bag 1 g I

## 2018-08-07 NOTE — DISCHARGE SUMMARY
[facility] Internal Medicine Discharge Summary   Patient ID:  Iam Kamara  [id number]  80year old  10/27/1934    Admit date: 8/3/2018    Discharge date and time: 8/7/2018     Attending Physician: Chris Wharton MD     Primary Care Physician: Shiela Birch MD medical problems including but not limited to hx  anxiety, CKD, HTN, here with left femoral neck fracture     Displaced Left femoral neck fracture s/p Left cemented bipolar hip replacement 8/5  Pain medicaiton ordered, tramadol decreased to 25mg given somn Intact, no focal deficits      Discharge meds     Medication List      START taking these medications    acetaminophen 325 MG Tabs  Commonly known as:  TYLENOL  Take 2 tablets (650 mg total) by mouth 4 (four) times daily.      cephALEXin 500 MG Caps  Common medications from any pharmacy    Bring a paper prescription for each of these medications  · HYDROcodone-acetaminophen 5-325 MG Tabs     Information about where to get these medications is not yet available    Ask your nurse or doctor about these medicatio appear somewhat demineralized. Degenerative changes are seen in the lumbar spine. CONCLUSION:  Displaced, angulated left femoral neck fracture.     Dictated by: Zoë Manriquez MD on 8/04/2018 at 11:06     Approved by: Zoë Manriquez MD

## 2018-08-07 NOTE — PROGRESS NOTES
Spoke with Mandarin  #[phone]. Instructed pt on what medications I was giving. Pt declined tylenol. Miralax given. Did not give suppository as pt had BM.

## 2018-08-07 NOTE — CM/SW NOTE
Financial authorization obtained for admission to [address] [place] [address].   [id number], eff 8/7/18 thru 8/9/18. This information was sent via [facility] to [facility]. I spoke with Daiana Fishman from [facility], they are able to accept pt today if pt is cleared.    I spoke with GALO Thurman,

## 2018-08-07 NOTE — PHYSICAL THERAPY NOTE
PHYSICAL THERAPY TREATMENT NOTE - INPATIENT    Room Number: 373/373-A     Session: 1   Number of Visits to Meet Established Goals: 5    Presenting Problem: S/p Left Cemented Bipolar Hip Replacement on 08/05/18  History related to current admission: Per MD Procedure: RIGHT PHACOEMULSIFICATION OF                CATARACT WITH INTRAOCULAR LENS IMPLANT 78230;                 Surgeon: Gisselle Dalal MD;  Location: [address] [place] [address]    SUBJECTIVE  Patient was received in bed, cooperative. No LE & chair closely following  Assistive Device: Rolling walker  Pattern: Shuffle  Stoop/Curb Assistance: Not tested  Comment : Above score is based on FIM definations    Skilled Therapy Provided: Patient was received in bed.  Cooperative during this session Research supports that patients with this level of impairment may benefit from Sub Acute Rehab with goal of restoring supervision level of mobility & then consider moving into Assisted living. PLAN  PT Treatment Plan: Bed mobility; Endurance; Energy conse

## 2018-08-07 NOTE — PROGRESS NOTES
Report called to Camille Hernandez, RN at [facility]. Pt taken via [facility] at this time. Pt's entecavir sent with pt's chart. Pt's son at bedside and aware of transfer. Scripts sent with pt's chart to facility.

## 2018-08-07 NOTE — PROGRESS NOTES
Assessment completed with  Trey Kayser # [id number]. Pt states only has pain when turns in bed or moves. Pt requesting suppository has not had BM in 3 days. Pt denies SOB/CP. Pt denies numbness/tingling in BLE. Negative Viky's sign.      Pt requested drissin

## 2018-08-08 ENCOUNTER — APPOINTMENT (OUTPATIENT)
Dept: CT IMAGING | Facility: HOSPITAL | Age: 83
DRG: 560 | End: 2018-08-08
Attending: EMERGENCY MEDICINE
Payer: MEDICARE

## 2018-08-08 ENCOUNTER — APPOINTMENT (OUTPATIENT)
Dept: GENERAL RADIOLOGY | Facility: HOSPITAL | Age: 83
DRG: 560 | End: 2018-08-08
Attending: EMERGENCY MEDICINE
Payer: MEDICARE

## 2018-08-08 ENCOUNTER — HOSPITAL ENCOUNTER (INPATIENT)
Facility: HOSPITAL | Age: 83
LOS: 3 days | Discharge: NURSING, REHAB OR LONG-TERM CARE | DRG: 560 | End: 2018-08-11
Attending: EMERGENCY MEDICINE | Admitting: HOSPITALIST
Payer: MEDICARE

## 2018-08-08 DIAGNOSIS — R29.6 MULTIPLE FALLS: Primary | ICD-10-CM

## 2018-08-08 LAB
ALBUMIN SERPL-MCNC: 2.7 G/DL (ref 3.5–4.8)
ALBUMIN/GLOB SERPL: 0.7 {RATIO} (ref 1–2)
ALP LIVER SERPL-CCNC: 70 U/L (ref 55–142)
ALT SERPL-CCNC: 16 U/L (ref 14–54)
ANION GAP SERPL CALC-SCNC: 7 MMOL/L (ref 0–18)
APTT PPP: 36.9 SECONDS (ref 26.1–34.6)
AST SERPL-CCNC: 28 U/L (ref 15–41)
BASOPHILS # BLD AUTO: 0.04 X10(3) UL (ref 0–0.1)
BASOPHILS NFR BLD AUTO: 0.3 %
BILIRUB SERPL-MCNC: 0.6 MG/DL (ref 0.1–2)
BILIRUB UR QL STRIP.AUTO: NEGATIVE
BUN BLD-MCNC: 13 MG/DL (ref 8–20)
BUN/CREAT SERPL: 17.1 (ref 10–20)
CALCIUM BLD-MCNC: 8.4 MG/DL (ref 8.3–10.3)
CHLORIDE SERPL-SCNC: 111 MMOL/L (ref 101–111)
CLARITY UR REFRACT.AUTO: CLEAR
CO2 SERPL-SCNC: 22 MMOL/L (ref 22–32)
CREAT BLD-MCNC: 0.76 MG/DL (ref 0.55–1.02)
EOSINOPHIL # BLD AUTO: 0.16 X10(3) UL (ref 0–0.3)
EOSINOPHIL NFR BLD AUTO: 1.3 %
ERYTHROCYTE [DISTWIDTH] IN BLOOD BY AUTOMATED COUNT: 13.2 % (ref 11.5–16)
GLOBULIN PLAS-MCNC: 3.8 G/DL (ref 2.5–3.7)
GLUCOSE BLD-MCNC: 122 MG/DL (ref 70–99)
GLUCOSE UR STRIP.AUTO-MCNC: NEGATIVE MG/DL
HCT VFR BLD AUTO: 35 % (ref 34–50)
HGB BLD-MCNC: 11.6 G/DL (ref 12–16)
IMMATURE GRANULOCYTE COUNT: 0.09 X10(3) UL (ref 0–1)
IMMATURE GRANULOCYTE RATIO %: 0.7 %
INR BLD: 1.06 (ref 0.9–1.1)
KETONES UR STRIP.AUTO-MCNC: NEGATIVE MG/DL
LEUKOCYTE ESTERASE UR QL STRIP.AUTO: NEGATIVE
LYMPHOCYTES # BLD AUTO: 0.82 X10(3) UL (ref 0.9–4)
LYMPHOCYTES NFR BLD AUTO: 6.7 %
M PROTEIN MFR SERPL ELPH: 6.5 G/DL (ref 6.1–8.3)
MCH RBC QN AUTO: 29.6 PG (ref 27–33.2)
MCHC RBC AUTO-ENTMCNC: 33.1 G/DL (ref 31–37)
MCV RBC AUTO: 89.3 FL (ref 81–100)
MONOCYTES # BLD AUTO: 1.01 X10(3) UL (ref 0.1–1)
MONOCYTES NFR BLD AUTO: 8.3 %
NEUTROPHIL ABS PRELIM: 10.12 X10 (3) UL (ref 1.3–6.7)
NEUTROPHILS # BLD AUTO: 10.12 X10(3) UL (ref 1.3–6.7)
NEUTROPHILS NFR BLD AUTO: 82.7 %
NITRITE UR QL STRIP.AUTO: NEGATIVE
OSMOLALITY SERPL CALC.SUM OF ELEC: 291 MOSM/KG (ref 275–295)
PH UR STRIP.AUTO: 6 [PH] (ref 4.5–8)
PLATELET # BLD AUTO: 149 10(3)UL (ref 150–450)
POTASSIUM SERPL-SCNC: 4 MMOL/L (ref 3.6–5.1)
PROT UR STRIP.AUTO-MCNC: NEGATIVE MG/DL
PSA SERPL DL<=0.01 NG/ML-MCNC: 13.6 SECONDS (ref 12–14.1)
RBC # BLD AUTO: 3.92 X10(6)UL (ref 3.8–5.1)
RED CELL DISTRIBUTION WIDTH-SD: 43.1 FL (ref 35.1–46.3)
SODIUM SERPL-SCNC: 140 MMOL/L (ref 136–144)
SP GR UR STRIP.AUTO: 1.01 (ref 1–1.03)
UROBILINOGEN UR STRIP.AUTO-MCNC: <2 MG/DL
WBC # BLD AUTO: 12.2 X10(3) UL (ref 4–13)

## 2018-08-08 PROCEDURE — 70450 CT HEAD/BRAIN W/O DYE: CPT | Performed by: EMERGENCY MEDICINE

## 2018-08-08 PROCEDURE — 73501 X-RAY EXAM HIP UNI 1 VIEW: CPT | Performed by: EMERGENCY MEDICINE

## 2018-08-08 PROCEDURE — 80053 COMPREHEN METABOLIC PANEL: CPT | Performed by: EMERGENCY MEDICINE

## 2018-08-08 PROCEDURE — 96361 HYDRATE IV INFUSION ADD-ON: CPT

## 2018-08-08 PROCEDURE — 85730 THROMBOPLASTIN TIME PARTIAL: CPT | Performed by: EMERGENCY MEDICINE

## 2018-08-08 PROCEDURE — 85610 PROTHROMBIN TIME: CPT | Performed by: EMERGENCY MEDICINE

## 2018-08-08 PROCEDURE — 99285 EMERGENCY DEPT VISIT HI MDM: CPT

## 2018-08-08 PROCEDURE — 51701 INSERT BLADDER CATHETER: CPT

## 2018-08-08 PROCEDURE — 96360 HYDRATION IV INFUSION INIT: CPT

## 2018-08-08 PROCEDURE — 85025 COMPLETE CBC W/AUTO DIFF WBC: CPT | Performed by: EMERGENCY MEDICINE

## 2018-08-08 PROCEDURE — 81001 URINALYSIS AUTO W/SCOPE: CPT | Performed by: EMERGENCY MEDICINE

## 2018-08-08 RX ORDER — SODIUM CHLORIDE 9 MG/ML
1000 INJECTION, SOLUTION INTRAVENOUS ONCE
Status: COMPLETED | OUTPATIENT
Start: 2018-08-08 | End: 2018-08-08

## 2018-08-08 RX ORDER — ACETAMINOPHEN 325 MG/1
650 TABLET ORAL 4 TIMES DAILY
Status: DISCONTINUED | OUTPATIENT
Start: 2018-08-08 | End: 2018-08-11

## 2018-08-08 RX ORDER — PANTOPRAZOLE SODIUM 20 MG/1
20 TABLET, DELAYED RELEASE ORAL
Status: DISCONTINUED | OUTPATIENT
Start: 2018-08-08 | End: 2018-08-11

## 2018-08-08 RX ORDER — OLANZAPINE 5 MG/1
5 TABLET ORAL NIGHTLY
Status: DISCONTINUED | OUTPATIENT
Start: 2018-08-08 | End: 2018-08-11

## 2018-08-08 RX ORDER — ASPIRIN 325 MG
325 TABLET ORAL 2 TIMES DAILY
Status: DISCONTINUED | OUTPATIENT
Start: 2018-08-08 | End: 2018-08-11

## 2018-08-08 RX ORDER — HYDROCODONE BITARTRATE AND ACETAMINOPHEN 5; 325 MG/1; MG/1
0.5 TABLET ORAL EVERY 4 HOURS PRN
Status: DISCONTINUED | OUTPATIENT
Start: 2018-08-08 | End: 2018-08-11

## 2018-08-08 RX ORDER — BENZONATATE 200 MG/1
200 CAPSULE ORAL EVERY 4 HOURS PRN
Status: DISCONTINUED | OUTPATIENT
Start: 2018-08-08 | End: 2018-08-11

## 2018-08-08 RX ORDER — ENTECAVIR 0.5 MG/1
0.5 TABLET, FILM COATED ORAL
Status: DISCONTINUED | OUTPATIENT
Start: 2018-08-08 | End: 2018-08-11

## 2018-08-08 NOTE — ED INITIAL ASSESSMENT (HPI)
Pt is s/p left hip repair. Pt at nursing home for rehab. Tonight pt fell twice. Once at 22:00 and again at 01:00. Pt points to left hip/pelvis area when asked where she has pain. Pt is mandarin speaking only.

## 2018-08-08 NOTE — ED PROVIDER NOTES
Patient Seen in: [facility] Emergency Department    History   Patient presents with:  Fall (musculoskeletal, neurologic)    Stated Complaint: fall    HPI    The patient is an 51-year-old female brought into the emergency department by ambulance from SURGERY  12/16/2015: [facility] CATARACT EXTRACAP,INSERT LENS Left      Comment: Procedure: LEFT PHACOEMULSIFICATION OF                CATARACT WITH INTRAOCULAR LENS IMPLANT 04692;                 Surgeon:  Gisselle Dalal MD;  Location: [facility] with flexion, extension, rotation. Cardiovascular: No anterior chest wall tenderness. regular rhythm without murmurs rubs or gallops, no peripheral edema or JVD  Lungs: Speaking full sentences without any distress or retractions.  Clear to auscultation bi PLATELET.   Procedure                               Abnormality         Status                     ---------                               -----------         ------                     CBC W/ DIFFERENTIAL[[id number]]          Abnormal            Final resul

## 2018-08-08 NOTE — ED NOTES
Report received from [facility]. Pt resting in bed. Language line at bedside. Pt positioned semi uprt in bed lt leg internally rotated. Pt has dsg to lt lat hip clean and dry, pos pedal pulses, good color and temp to lower ext.

## 2018-08-08 NOTE — H&P
General Medicine H&P     Patient presents with:  Fall (musculoskeletal, neurologic)       PCP: Yanni Redd MD    History of Present Illness:     HPI 81 yo female with multiple medical problems including but not limited to hx  anxiety, CKD, HTN, hepat Smoking status: Never Smoker    Smokeless tobacco: Never Used    Comment: caffeine: tea    Alcohol use No        Fam Hx  No family history on file. Review of Systems  Comprehensive ROS reviewed and negative except for what's stated above.  \    OBJECTIVE hemorrhage. Periventricular and subcortical low attenuation are nonspecific but most consistent with chronic small vessel ischemic change. Punctate calcifications within the basal ganglia bilaterally.   SINUSES:           Near complete opacification involv (CPT=73501)  TECHNIQUE:  Unilateral view of the hip. COMPARISON:  [facility] , XR HIP W OR WO PELVIS 2 OR 3 VIEWS, LEFT (CPT=73502), 8/04/2018, 10:34.   INDICATIONS:  post op  PATIENT STATED HISTORY: (As transcribed by Technologist)  Patient offered no additio L5 fracture, subsequent CT reportedly with nor L5 fracture, artifact     Fall prior to admission  Patient reported that she tripped  Has UTI which may have controbuted  Had CT head/neck at OSH  Ceftriaxone IV for UTI (was initiated at OSH), will need to to

## 2018-08-08 NOTE — ED NOTES
X rays completed. Pts family arrived to ED and updated with pts progress at this time. Pt awaits CT scan.

## 2018-08-08 NOTE — CM/SW NOTE
Patient failed inpatient criteria. Second level of review completed and supports observation. UR committee and Dr Samira Matthews in agreement. Observation letter given to the patient and order written.

## 2018-08-08 NOTE — PAYOR COMM NOTE
--------------  ADMISSION REVIEW     Payor: [address] [place] [address] #:  [id number]  Authorization Number: [id number]    Admit date: 8/8/18  Admit time: [address] [facility]       Admitting Physician: Abiola Sethi MD  Attending Physician:  Bobbetta Babinski significant left hip pain.       Past Medical History:   Diagnosis Date   • Cataract    • Cataracts, bilateral    • Chronic urticaria     has seen dermatology   • CKD (chronic kidney disease) stage 3, GFR 30-59 ml/min ([facility])    • Hearing impairment    • Hear (Room air)    Current:/65   Pulse 82   Temp 97.6 °F (36.4 °C) (Temporal)   Resp 16   Ht 142.2 cm (4' 8\")   Wt 51.3 kg   SpO2 95%   BMI 25.36 kg/m²          Physical Exam  General: Alert and oriented to her name and location, but not the date, in no the following:     PTT 36.9 (*)     All other components within normal limits   URINALYSIS WITH CULTURE REFLEX - Abnormal; Notable for the following:     Blood Urine Small (*)     Mucous Urine 1+ (*)     All other components within normal limits   CBC W/ D fall risk to go back to [facility]. I discussed the patient with Dr. Sergo Fernandez who will admit the patient. Patient was admitted in a stable condition.         Disposition and Plan     Clinical Impression:  Multiple falls  (primary encounter diagn deficits      LABS:     Lab Results  Component Value Date   WBC 12.2 08/08/2018   HGB 11.6 08/08/2018   HCT 35.0 08/08/2018   .0 08/08/2018   CREATSERUM 0.76 08/08/2018   BUN 13 08/08/2018    08/08/2018   K 4.0 08/08/2018    08/08/2018 Atherosclerosis. CONCLUSION:  1. No acute intracranial hemorrhage. 2. Findings most consistent with chronic small vessel ischemic change within the deep white matter.   3.  Sinus disease involving the visualized right maxillary sinus, correlate clinic Dayana Antonio MD on 8/05/2018 at 16:20     Approved by: Dayana Antonio MD            Xr Hip W Or Wo Pelvis 2 Or 3 Views, Left (cpt=73502)    Result Date: 8/4/2018  PROCEDURE:  XR HIP W OR WO PELVIS 2 OR 3 VIEWS, LEFT (CPT=73502)  TECHNIQUE:  Germaine Melgar PCP     Voice changes, OP erythema; check throat culture     Chronic hepatitis entecavir ordered     Accu-checks ordered but no hx DM noted, BG ok, will dc     Preventative asa 325mg BID per ortho     Dispo: CINDY input for dc    Outpatient records or previou

## 2018-08-08 NOTE — PLAN OF CARE
NEUROLOGICAL - ADULT    • Achieves stable or improved neurological status Progressing        Patient/Family Goals    • Patient/Family Long Term Goal Progressing    • Patient/Family Short Term Goal Progressing        SAFETY ADULT - FALL    • Free from fall

## 2018-08-08 NOTE — CM/SW NOTE
08/08/18 0800   Discharge disposition   Expected discharge disposition Skilled Nurs   Name of Sean Avila   Discharge transportation ([facility])   DC 8/7/18

## 2018-08-09 PROCEDURE — 97116 GAIT TRAINING THERAPY: CPT

## 2018-08-09 PROCEDURE — 97166 OT EVAL MOD COMPLEX 45 MIN: CPT

## 2018-08-09 PROCEDURE — 97162 PT EVAL MOD COMPLEX 30 MIN: CPT

## 2018-08-09 PROCEDURE — 97530 THERAPEUTIC ACTIVITIES: CPT

## 2018-08-09 PROCEDURE — 97110 THERAPEUTIC EXERCISES: CPT

## 2018-08-09 RX ORDER — TRAMADOL HYDROCHLORIDE 50 MG/1
25 TABLET ORAL EVERY 6 HOURS PRN
Status: DISCONTINUED | OUTPATIENT
Start: 2018-08-09 | End: 2018-08-11

## 2018-08-09 NOTE — CM/SW NOTE
Pt was discharged to [address] [place] [address] on 8/7 for rehab after surgical repair of hip fx. She came back to the hospital the next morning as she was confused overnight, getting out of bed unassisted and fell at [place].      CM was able to meet with pts family and spok

## 2018-08-09 NOTE — PLAN OF CARE
Spoke with son, Darci Collado, does not feel comfortable with mother returning to [facility]. Will update  with new decision.      Son mentioned that he called [facility] about the missing Hep B medication, entecavir, [facility] states that this medication was not sen

## 2018-08-09 NOTE — PLAN OF CARE
DISCHARGE PLANNING    • Discharge to home or other facility with appropriate resources Adequate for Discharge        NEUROLOGICAL - ADULT    • Achieves stable or improved neurological status Adequate for Discharge        PAIN - ADULT    • Verbalizes/displa

## 2018-08-09 NOTE — PHYSICAL THERAPY NOTE
PHYSICAL THERAPY EVALUATION - INPATIENT     Room Number: 381/381-A  Evaluation Date: 8/9/2018  Type of Evaluation: Initial  Physician Order: PT Eval and Treat    Presenting Problem: L hip pain  Reason for Therapy: Mobility Dysfunction and Discharge P Apartment   Home Layout: One level                              Prior Level of [place]: family member (great niece) provided info due to language barrier. Prior to hip fx Pt was living alone in  Apartments and mobilizing without a device.  Attends ad currently need. ..   -   Moving to and from a bed to a chair (including a wheelchair)?: A Lot   -   Need to walk in hospital room?: A Lot   -   Climbing 3-5 steps with a railing?: Total       AM-PAC Score:  Raw Score: 11   PT Approx Degree of Impairment Sco personal factors impacting therapy include recent L KEVIN on 8/5/18 and dementia. In this PT evaluation, the patient presents with the following impairments decr strength, decr ROM, pain, and impaired standing balance.   Based on this evaluation, patient's c

## 2018-08-09 NOTE — PROGRESS NOTES
[address] [facility] Patient Status:  Observation    10/27/1934 MRN [id number]   [facility] 3SW-A Attending Elizabeth Birch MD   [facility] Day # 1 PCP Beacher Lombard, MD     Anival Mcpherson is a 80year old female patient.     Ran 8\" (1.422 m), weight 113 lb 1.5 oz (51.3 kg), SpO2 96 %. Subjective:  Symptoms:  Stable. Diet:  Adequate intake. Activity level: Impaired due to weakness. Pain:  She complains of pain that is mild. She reports pain is improving.   Pain is w

## 2018-08-09 NOTE — PROGRESS NOTES
[facility] Hospitalist Progress Note     PCP: Edwin Jon MD    Chief Complaint: follow-up    Overnight/Interim Events:      SUBJECTIVE:  Pt laying in bed, pain ok.      OBJECTIVE:  Temp:  [97.8 °F (36.6 °C)-98.5 °F (36.9 °C)] 98.1 °F (36.7 °C)  Pulse:  [6 08/06/18   1218  08/06/18   1419   PGLU  101*  101*  92  158*  100*       No results for input(s): TROP in the last 168 hours.       Meds:     • acetaminophen  650 mg Oral QID   • aspirin  325 mg Oral BID   • Entecavir  0.5 mg Oral Daily   • OLANZapine  5 m

## 2018-08-09 NOTE — OCCUPATIONAL THERAPY NOTE
OCCUPATIONAL THERAPY EVALUATION - INPATIENT     Room Number: 381/381-A  Evaluation Date: 8/9/2018  Type of Evaluation: Initial  Presenting Problem: fall at [address]    Physician Order: IP Consult to Occupational Therapy  Reason for Therapy: ADL/IADL Dysfunction PROFILE    HOME SITUATION  Type of Home: Apartment  Home Layout: One level       Toilet and Equipment: Standard height toilet  Shower/Tub and Equipment: Tub-shower combo  Other Equipment: None    Occupation/Status: retired  Hand Dominance: Right          P body clothing?: A Lot  -   Bathing (including washing, rinsing, drying)?: A Lot  -   Toileting, which includes using toilet, bedpan or urinal? : A Lot  -   Putting on and taking off regular upper body clothing?: A Lot  -   Taking care of personal grooming Expanded review of history including review of medical or therapy record   Specific performance deficits impacting engagement in ADL/IADL MODERATE  3 - 5 performance deficits   Client Assessment/Performance Deficits MODERATE - Comorbidities and min to mod

## 2018-08-10 PROCEDURE — 97535 SELF CARE MNGMENT TRAINING: CPT

## 2018-08-10 PROCEDURE — 97530 THERAPEUTIC ACTIVITIES: CPT

## 2018-08-10 PROCEDURE — 97116 GAIT TRAINING THERAPY: CPT

## 2018-08-10 NOTE — CM/SW NOTE
sw left VM for KAYLA Ibrahim informing her that pt is cleared medically for DC however [facility] and is [facility]. Awaiting call back from KAYLA to finalize DC plans.  [facility] is out of town

## 2018-08-10 NOTE — CM/SW NOTE
MSW left a message for the patient's daughter Bishnu Matthews to discuss potential dc to [facility] for the dementia unit with rehab. Awaiting return call. MSW also updated EVMORENORE on two pending facilities.     Rhoda Kendall, [facility]

## 2018-08-10 NOTE — PHYSICAL THERAPY NOTE
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)  History related to current CATARACT WITH INTRAOCULAR LENS IMPLANT [id number];                 Surgeon:  Shamar Mathis MD;  Location: [address] [place] [address]  1/6/2016: [facility] CATARACT EXTRACAP,INSERT LENS Right      Comment: Procedure: RIGHT PHACOEMULSIFICATION OF Approx Degree of Impairment Score: 72.57%   Standardized Score (AM-PAC Scale): 33.86   CMS Modifier (G-Code): CL    FUNCTIONAL ABILITY STATUS  Gait Assessment   Gait Assistance: Dependent assistance (Actual assist - mod to max )  Distance (ft): 25 ft  Assi demonstrating a 72.57% degree of impairment in mobility.  Research supports that patients with this level of impairment may benefit from Sub Acute Rehab @ d/c with goal of achieving supervision level of functional mobility & then consider move to assisted l

## 2018-08-10 NOTE — PROGRESS NOTES
[facility] Hospitalist Progress Note     PCP: Benito Man MD    Chief Complaint: follow-up    Overnight/Interim Events:      SUBJECTIVE:  Pt laying in bed, pain ok. Granddaughter helping to translate, reviewed hospital course c her.      OBJECTIVE:  Temp: Labs   Lab  08/05/18   1822  08/05/18   2115  08/06/18   0720  08/06/18   1218  08/06/18   1419   PGLU  101*  101*  92  158*  100*       No results for input(s): TROP in the last 168 hours.       Meds:     • amLODIPine-lisinopril (LOTREL 5/20) combination t Hospitalist  [phone]  8/10/2018  5:25 PM

## 2018-08-10 NOTE — OCCUPATIONAL THERAPY NOTE
OCCUPATIONAL THERAPY TREATMENT NOTE - INPATIENT     Room Number: 381/381-A  Session: 1   Number of Visits to Meet Established Goals: 5    Presenting Problem: fall at [facility]    History related to current admission: Pt suffered L hip fx and underwent KEVIN on 8/5 time due to language barrier and PMH of dementia. OBJECTIVE  Precautions: KEVIN - posterior;  needed (primarily [facility]  speaking)    WEIGHT BEARING RESTRICTION  Weight Bearing Restriction: L lower extremity           L Lower Extremity: Weight B session/findings; All patient questions and concerns addressed;SCDs in place    ASSESSMENT   Pt is a 79 y/o female with a history of frequent falls, who suffered a L hip fx resulting in LTHA on 8/5/18.   Pt was discharged to [facility] on 8/7/18 however fell twice

## 2018-08-11 VITALS
OXYGEN SATURATION: 96 % | HEIGHT: 56 IN | HEART RATE: 69 BPM | WEIGHT: 113.13 LBS | RESPIRATION RATE: 18 BRPM | TEMPERATURE: 98 F | DIASTOLIC BLOOD PRESSURE: 58 MMHG | BODY MASS INDEX: 25.45 KG/M2 | SYSTOLIC BLOOD PRESSURE: 120 MMHG

## 2018-08-11 PROCEDURE — 97110 THERAPEUTIC EXERCISES: CPT

## 2018-08-11 PROCEDURE — 97530 THERAPEUTIC ACTIVITIES: CPT

## 2018-08-11 PROCEDURE — 97116 GAIT TRAINING THERAPY: CPT

## 2018-08-11 RX ORDER — HYDROCODONE BITARTRATE AND ACETAMINOPHEN 5; 325 MG/1; MG/1
0.5 TABLET ORAL EVERY 4 HOURS PRN
Qty: 15 TABLET | Refills: 0 | Status: SHIPPED | OUTPATIENT
Start: 2018-08-11 | End: 2018-10-04

## 2018-08-11 RX ORDER — TRAMADOL HYDROCHLORIDE 50 MG/1
25 TABLET ORAL EVERY 6 HOURS PRN
Qty: 30 TABLET | Refills: 0 | Status: SHIPPED | OUTPATIENT
Start: 2018-08-11 | End: 2018-10-04

## 2018-08-11 NOTE — PHYSICAL THERAPY NOTE
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)     History related to curre CATARACT WITH INTRAOCULAR LENS IMPLANT [id number];                 Surgeon:  Jolanta Warner MD;  Location: [address] [place] [address]  1/6/2016: [facility] CATARACT EXTRACAP,INSERT LENS Right      Comment: Procedure: RIGHT PHACOEMULSIFICATION OF Modifier (G-Code): CL    FUNCTIONAL ABILITY STATUS  Gait Assessment   Gait Assistance: Dependent assistance (Based on FIM scale per dept protocol)  Distance (ft): 30  Assistive Device: Rolling walker  Pattern: L Decreased stance time; Shuffle (Narrow abby) RECOMMENDATIONS  PT Discharge Recommendations: Sub-acute rehabilitation (ELOS : 19 to 21 days)     PLAN  PT Treatment Plan: Bed mobility; Endurance; Patient education;Gait training;Range of motion;Strengthening;Transfer training;Balance training  Rehab Camila

## 2018-08-11 NOTE — DISCHARGE SUMMARY
General Medicine Discharge Summary     Patient ID:  Heather Zavaleta  80year old  10/27/1934    Admit date: 8/8/2018    Discharge date and time: 8/11/2018    Attending Physician: Bobby Antonio MD erythema; throat culture neg     Chronic hepatitis entecavir ordered     Accu-checks ordered but no hx DM noted, BG ok, will dc     Preventative asa 325mg BID per ortho           Consults: IP CONSULT TO HOSPITALIST  IP CONSULT TO SOCIAL WORK    Operative P

## 2018-08-11 NOTE — CM/SW NOTE
Final dc orders received. vijaya [facility]. Son Dalals Hsu is in agreement with this plan. vijaya arranged S transportation through [employer] trip #[id number]  for 330pm. MCA and PCS form completed.      RN to call report to 61

## 2018-08-11 NOTE — PLAN OF CARE
D/c paperwork and entecavir medication given to pt's son, Hanh Posey to deliver to receiving RN at [place] [address] so it does not get misplaced. Scripts for pain medication in envelope for receiving RN. Report called to RN at [place] [address], [place].

## 2018-08-13 NOTE — CM/SW NOTE
08/13/18 1100   Discharge disposition   Expected discharge disposition Skilled Nurs   Name of [address] [place] [address]   Discharge transportation [facility], dementia unit, DC date 8/12/18

## 2018-08-23 PROBLEM — Z96.642 STATUS POST LEFT HIP REPLACEMENT: Status: ACTIVE | Noted: 2018-08-23

## 2018-08-29 ENCOUNTER — BH HISTORICAL (OUTPATIENT)
Dept: OTHER | Age: 83
End: 2018-08-29

## 2018-10-08 ENCOUNTER — BH HISTORICAL (OUTPATIENT)
Dept: OTHER | Age: 83
End: 2018-10-08

## 2018-10-09 ENCOUNTER — BH HISTORICAL (OUTPATIENT)
Dept: OTHER | Age: 83
End: 2018-10-09

## 2018-10-11 PROBLEM — M25.552 LEFT HIP PAIN: Status: ACTIVE | Noted: 2018-10-11

## 2018-10-18 ENCOUNTER — BH HISTORICAL (OUTPATIENT)
Dept: OTHER | Age: 83
End: 2018-10-18

## 2018-11-02 ENCOUNTER — BH HISTORICAL (OUTPATIENT)
Dept: OTHER | Age: 83
End: 2018-11-02

## 2018-11-19 ENCOUNTER — BH HISTORICAL (OUTPATIENT)
Dept: OTHER | Age: 83
End: 2018-11-19

## 2018-12-18 ENCOUNTER — TELEPHONE (OUTPATIENT)
Dept: BEHAVIORAL HEALTH | Age: 83
End: 2018-12-18

## 2019-01-02 RX ORDER — RISPERIDONE 0.5 MG/1
0.5 TABLET ORAL NIGHTLY
Qty: 30 TABLET | Refills: 0 | Status: SHIPPED | OUTPATIENT
Start: 2019-01-02 | End: 2019-01-28 | Stop reason: SDUPTHER

## 2019-01-07 ENCOUNTER — HOSPITAL (OUTPATIENT)
Dept: OTHER | Age: 84
End: 2019-01-07

## 2019-01-07 ENCOUNTER — DIAGNOSTIC TRANS (OUTPATIENT)
Dept: OTHER | Age: 84
End: 2019-01-07

## 2019-01-07 LAB
2009 H1N1 SUBTYPE (RF1N1): NOT DETECTED
ADENOVIRUS (RADENO): NOT DETECTED
ALBUMIN SERPL-MCNC: 3.8 GM/DL (ref 3.6–5.1)
ALP SERPL-CCNC: 66 UNIT/L (ref 45–117)
ALT SERPL-CCNC: 15 UNIT/L
AMORPH SED URNS QL MICRO: ABNORMAL
ANALYZER ANC (IANC): NORMAL
ANION GAP SERPL CALC-SCNC: 12 MMOL/L (ref 10–20)
APPEARANCE UR: CLEAR
AST SERPL-CCNC: 21 UNIT/L
BACTERIA #/AREA URNS HPF: ABNORMAL /HPF
BASOPHILS # BLD: 0 THOUSAND/MCL (ref 0–0.3)
BASOPHILS NFR BLD: 1 %
BILIRUB CONJ SERPL-MCNC: 0.2 MG/DL (ref 0–0.2)
BILIRUB SERPL-MCNC: 0.7 MG/DL (ref 0.2–1)
BILIRUB UR QL: NEGATIVE
BOCAVIRUS (RBOCA): NOT DETECTED
BUN SERPL-MCNC: 25 MG/DL (ref 6–20)
BUN/CREAT SERPL: 19 (ref 7–25)
C. PNEUMONIAE (RCHLP): NOT DETECTED
CALCIUM SERPL-MCNC: 8.9 MG/DL (ref 8.4–10.2)
CAOX CRY URNS QL MICRO: ABNORMAL
CHLORIDE: 103 MMOL/L (ref 98–107)
CO2 SERPL-SCNC: 29 MMOL/L (ref 21–32)
COLOR UR: ABNORMAL
CORONAVIRUS 229E (RC229E): NOT DETECTED
CORONAVIRUS HKU1 (RCHKU1): NOT DETECTED
CORONAVIRUS NL63 (RCNL63): NOT DETECTED
CORONAVIRUS OC43 (RCO43): NOT DETECTED
CREAT SERPL-MCNC: 1.3 MG/DL (ref 0.51–0.95)
DIFFERENTIAL METHOD BLD: NORMAL
EOSINOPHIL # BLD: 0.1 THOUSAND/MCL (ref 0.1–0.5)
EOSINOPHIL NFR BLD: 1 %
EPITH CASTS #/AREA URNS LPF: ABNORMAL /[LPF]
ERYTHROCYTE [DISTWIDTH] IN BLOOD: 13.2 % (ref 11–15)
FATTY CASTS #/AREA URNS LPF: ABNORMAL /[LPF]
GLUCOSE SERPL-MCNC: 104 MG/DL (ref 65–99)
GLUCOSE UR-MCNC: NEGATIVE MG/DL
GRAN CASTS #/AREA URNS LPF: ABNORMAL /[LPF]
HEMATOCRIT: 42.5 % (ref 36–46.5)
HGB BLD-MCNC: 13.8 GM/DL (ref 12–15.5)
HGB UR QL: NEGATIVE
HYALINE CASTS #/AREA URNS LPF: ABNORMAL /LPF (ref 0–5)
IMM GRANULOCYTES # BLD AUTO: 0 THOUSAND/MCL (ref 0–0.2)
IMM GRANULOCYTES NFR BLD: 0 %
INFLUENZA A SUBTYPE H1 (RFLH1): NOT DETECTED
INFLUENZA A SUBTYPE H3 (RFLH3): NOT DETECTED
INFLUENZA A UNSUBTYPABLE (RIAU): NORMAL
INFLUENZA B VIRUS (XFLUB): NOT DETECTED
KETONES UR-MCNC: NEGATIVE MG/DL
LEUKOCYTE ESTERASE UR QL STRIP: NEGATIVE
LYMPHOCYTES # BLD: 1.1 THOUSAND/MCL (ref 1–4)
LYMPHOCYTES NFR BLD: 20 %
M. PNEUMONIAE (RMYPP): NOT DETECTED
MCH RBC QN AUTO: 29 PG (ref 26–34)
MCHC RBC AUTO-ENTMCNC: 32.5 GM/DL (ref 32–36.5)
MCV RBC AUTO: 89.3 FL (ref 78–100)
METAPNEUMOVIRUS (RMETA): NOT DETECTED
MIXED CELL CASTS #/AREA URNS LPF: ABNORMAL /[LPF]
MONOCYTES # BLD: 0.5 THOUSAND/MCL (ref 0.3–0.9)
MONOCYTES NFR BLD: 9 %
MUCOUS THREADS URNS QL MICRO: ABNORMAL
NEUTROPHILS # BLD: 3.9 THOUSAND/MCL (ref 1.8–7.7)
NEUTROPHILS NFR BLD: 69 %
NEUTS SEG NFR BLD: NORMAL %
NITRITE UR QL: NEGATIVE
NRBC (NRBCRE): 0 /100 WBC
PARAINFLUENZA, TYPE 1 (RPAR1): NOT DETECTED
PARAINFLUENZA, TYPE 2 (RPAR2): NOT DETECTED
PARAINFLUENZA, TYPE 3 (RPAR3): NOT DETECTED
PARAINFLUENZA, TYPE 4 (RPAR4): NOT DETECTED
PH UR: 7 UNIT (ref 5–7)
PLATELET # BLD: 189 THOUSAND/MCL (ref 140–450)
POTASSIUM SERPL-SCNC: 3.6 MMOL/L (ref 3.4–5.1)
PROCALCITONIN SERPL IA-MCNC: <0.05 NG/ML
PROT SERPL-MCNC: 7.5 GM/DL (ref 6.4–8.2)
PROT UR QL: NEGATIVE MG/DL
RBC # BLD: 4.76 MILLION/MCL (ref 4–5.2)
RBC #/AREA URNS HPF: ABNORMAL /HPF (ref 0–3)
RBC CASTS #/AREA URNS LPF: ABNORMAL /[LPF]
RENAL EPI CELLS #/AREA URNS HPF: ABNORMAL /[HPF]
RHINOVIRUS/ENTEROVIRUS (RRHINO): NOT DETECTED
RSV, SUBTYPE A (RRSVA): NOT DETECTED
RSV, SUBTYPE B (RRSVB): NOT DETECTED
SODIUM SERPL-SCNC: 140 MMOL/L (ref 135–145)
SP GR UR: <1.005 (ref 1–1.03)
SPECIMEN SOURCE: ABNORMAL
SPECIMEN SOURCE: NORMAL
SPERM URNS QL MICRO: ABNORMAL
SQUAMOUS #/AREA URNS HPF: ABNORMAL /HPF (ref 0–5)
T VAGINALIS URNS QL MICRO: ABNORMAL
TRI-PHOS CRY URNS QL MICRO: ABNORMAL
TROPONIN I SERPL HS-MCNC: <0.02 NG/ML
URATE CRY URNS QL MICRO: ABNORMAL
URINE REFLEX: ABNORMAL
URNS CMNT MICRO: ABNORMAL
UROBILINOGEN UR QL: 0.2 MG/DL (ref 0–1)
WAXY CASTS #/AREA URNS LPF: ABNORMAL /[LPF]
WBC # BLD: 5.6 THOUSAND/MCL (ref 4.2–11)
WBC #/AREA URNS HPF: ABNORMAL /HPF (ref 0–5)
WBC CASTS #/AREA URNS LPF: ABNORMAL /[LPF]
YEAST HYPHAE URNS QL MICRO: ABNORMAL
YEAST URNS QL MICRO: ABNORMAL

## 2019-01-08 ENCOUNTER — HOSPITAL (OUTPATIENT)
Dept: OTHER | Age: 84
End: 2019-01-08
Attending: HOSPITALIST

## 2019-01-08 LAB
ANION GAP SERPL CALC-SCNC: 13 MMOL/L (ref 10–20)
BUN SERPL-MCNC: 32 MG/DL (ref 6–20)
BUN/CREAT SERPL: 23 (ref 7–25)
CALCIUM SERPL-MCNC: 8.6 MG/DL (ref 8.4–10.2)
CHLORIDE: 105 MMOL/L (ref 98–107)
CO2 SERPL-SCNC: 26 MMOL/L (ref 21–32)
CREAT SERPL-MCNC: 1.39 MG/DL (ref 0.51–0.95)
GLUCOSE SERPL-MCNC: 91 MG/DL (ref 65–99)
POTASSIUM SERPL-SCNC: 3.7 MMOL/L (ref 3.4–5.1)
PROCALCITONIN SERPL IA-MCNC: <0.05 NG/ML
SODIUM SERPL-SCNC: 140 MMOL/L (ref 135–145)

## 2019-01-09 LAB
ANION GAP SERPL CALC-SCNC: 15 MMOL/L (ref 10–20)
BUN SERPL-MCNC: 33 MG/DL (ref 6–20)
BUN/CREAT SERPL: 27 (ref 7–25)
CALCIUM SERPL-MCNC: 8.9 MG/DL (ref 8.4–10.2)
CHLORIDE: 104 MMOL/L (ref 98–107)
CO2 SERPL-SCNC: 25 MMOL/L (ref 21–32)
CREAT SERPL-MCNC: 1.23 MG/DL (ref 0.51–0.95)
GLUCOSE SERPL-MCNC: 96 MG/DL (ref 65–99)
POTASSIUM SERPL-SCNC: 3.6 MMOL/L (ref 3.4–5.1)
SODIUM SERPL-SCNC: 140 MMOL/L (ref 135–145)

## 2019-01-16 PROBLEM — S72.009A HIP FRACTURE (HCC): Status: RESOLVED | Noted: 2018-08-03 | Resolved: 2019-01-16

## 2019-01-16 PROBLEM — M50.30 DDD (DEGENERATIVE DISC DISEASE), CERVICAL: Status: ACTIVE | Noted: 2019-01-16

## 2019-01-16 PROBLEM — M51.36 DDD (DEGENERATIVE DISC DISEASE), LUMBAR: Status: ACTIVE | Noted: 2019-01-16

## 2019-01-16 PROBLEM — Z47.89 ORTHOPEDIC AFTERCARE: Status: RESOLVED | Noted: 2018-08-06 | Resolved: 2019-01-16

## 2019-01-16 PROBLEM — M25.552 LEFT HIP PAIN: Status: RESOLVED | Noted: 2018-10-11 | Resolved: 2019-01-16

## 2019-01-28 RX ORDER — RISPERIDONE 0.5 MG/1
0.5 TABLET ORAL NIGHTLY
Qty: 30 TABLET | Refills: 0 | Status: SHIPPED | OUTPATIENT
Start: 2019-01-28 | End: 2019-05-10 | Stop reason: SDUPTHER

## 2019-02-01 RX ORDER — ESCITALOPRAM OXALATE 10 MG/1
TABLET ORAL
Qty: 90 TABLET | Refills: 1 | Status: SHIPPED | OUTPATIENT
Start: 2019-02-01 | End: 2019-05-10 | Stop reason: SDUPTHER

## 2019-02-06 RX ORDER — ENTECAVIR 0.5 MG/1
TABLET, FILM COATED ORAL
COMMUNITY
Start: 2018-02-03

## 2019-02-06 RX ORDER — AMLODIPINE BESYLATE AND BENAZEPRIL HYDROCHLORIDE 5; 20 MG/1; MG/1
CAPSULE ORAL
COMMUNITY
Start: 2018-01-17

## 2019-02-12 ENCOUNTER — APPOINTMENT (OUTPATIENT)
Dept: BEHAVIORAL HEALTH | Age: 84
End: 2019-02-12

## 2019-04-12 ENCOUNTER — APPOINTMENT (OUTPATIENT)
Dept: GENERAL RADIOLOGY | Facility: HOSPITAL | Age: 84
End: 2019-04-12
Attending: EMERGENCY MEDICINE
Payer: MEDICARE

## 2019-04-12 ENCOUNTER — HOSPITAL ENCOUNTER (EMERGENCY)
Facility: HOSPITAL | Age: 84
Discharge: HOME | End: 2019-04-12
Attending: EMERGENCY MEDICINE
Payer: MEDICARE

## 2019-04-12 ENCOUNTER — APPOINTMENT (OUTPATIENT)
Dept: CT IMAGING | Facility: HOSPITAL | Age: 84
End: 2019-04-12
Attending: EMERGENCY MEDICINE
Payer: MEDICARE

## 2019-04-12 VITALS
WEIGHT: 106 LBS | DIASTOLIC BLOOD PRESSURE: 78 MMHG | SYSTOLIC BLOOD PRESSURE: 124 MMHG | HEIGHT: 57.48 IN | OXYGEN SATURATION: 96 % | RESPIRATION RATE: 16 BRPM | TEMPERATURE: 97 F | HEART RATE: 72 BPM | BODY MASS INDEX: 22.56 KG/M2

## 2019-04-12 DIAGNOSIS — R07.89 CHEST PAIN, ATYPICAL: Primary | ICD-10-CM

## 2019-04-12 PROCEDURE — 71275 CT ANGIOGRAPHY CHEST: CPT | Performed by: EMERGENCY MEDICINE

## 2019-04-12 PROCEDURE — 85610 PROTHROMBIN TIME: CPT | Performed by: EMERGENCY MEDICINE

## 2019-04-12 PROCEDURE — 85025 COMPLETE CBC W/AUTO DIFF WBC: CPT | Performed by: EMERGENCY MEDICINE

## 2019-04-12 PROCEDURE — 93010 ELECTROCARDIOGRAM REPORT: CPT

## 2019-04-12 PROCEDURE — 99285 EMERGENCY DEPT VISIT HI MDM: CPT

## 2019-04-12 PROCEDURE — 85378 FIBRIN DEGRADE SEMIQUANT: CPT | Performed by: EMERGENCY MEDICINE

## 2019-04-12 PROCEDURE — 80053 COMPREHEN METABOLIC PANEL: CPT | Performed by: EMERGENCY MEDICINE

## 2019-04-12 PROCEDURE — 93005 ELECTROCARDIOGRAM TRACING: CPT

## 2019-04-12 PROCEDURE — 84484 ASSAY OF TROPONIN QUANT: CPT | Performed by: EMERGENCY MEDICINE

## 2019-04-12 PROCEDURE — 36415 COLL VENOUS BLD VENIPUNCTURE: CPT

## 2019-04-12 PROCEDURE — 85730 THROMBOPLASTIN TIME PARTIAL: CPT | Performed by: EMERGENCY MEDICINE

## 2019-04-12 PROCEDURE — 71045 X-RAY EXAM CHEST 1 VIEW: CPT | Performed by: EMERGENCY MEDICINE

## 2019-04-12 NOTE — ED PROVIDER NOTES
Patient Seen in: [facility] Emergency Department    History   Patient presents with:  Chest Pain Angina (cardiovascular)    Stated Complaint: chest pain    HPI    75-year-old female with a history of anxiety, depression, chronic kidney disease, hist SURGERY Left 08/05/2018    left cemented bipolar hip replacement per Dr. Aidna Costa   • LEFT PHACOEMULSIFICATION OF CATARACT WITH INTRAOCULAR LENS IMPLANT [id number] Left 12/16/2015    Performed by Jolanta Warner MD at [address] [place] [address] Reviewed   COMP METABOLIC PANEL (14) - Abnormal; Notable for the following components:       Result Value    BUN 32 (*)     Creatinine 1.20 (*)     BUN/CREA Ratio 26.7 (*)     GFR, Non- 42 (*)     GFR, -American 48 (*)     A/G Ratio Technologist):    Reason For Exam (entered by Technologist):  [address]  Other Notes (entered by Technologist): [facility] -[phone]    Additional Information (per Vision Radiologist):      CTA CHEST      IMPRESSION help prevent relapse or worsening. Patient was instructed to follow-up with her primary care provider for further evaluation and treatment, but to return immediately to the ER for worsening, concerning, new, changing or persisting symptoms.   I discussed t

## 2019-04-12 NOTE — ED INITIAL ASSESSMENT (HPI)
Pt presents to ed via ems from [facility] with chest pain that started approx 1 hour ago with slight shortness of breath. Pt is mandarin speaking. Pt is alert.

## 2019-04-12 NOTE — ED NOTES
Pt son at bedside requesting pt be sent back to [facility] via ambulance.

## 2019-05-10 ENCOUNTER — OFFICE VISIT (OUTPATIENT)
Dept: BEHAVIORAL HEALTH | Age: 84
End: 2019-05-10

## 2019-05-10 DIAGNOSIS — G30.1 LATE ONSET ALZHEIMER'S DISEASE WITHOUT BEHAVIORAL DISTURBANCE (CMD): ICD-10-CM

## 2019-05-10 DIAGNOSIS — F39 PSYCHOTIC AFFECTIVE DISORDER (CMD): Primary | ICD-10-CM

## 2019-05-10 DIAGNOSIS — F02.80 LATE ONSET ALZHEIMER'S DISEASE WITHOUT BEHAVIORAL DISTURBANCE (CMD): ICD-10-CM

## 2019-05-10 PROCEDURE — 99214 OFFICE O/P EST MOD 30 MIN: CPT | Performed by: PSYCHIATRY & NEUROLOGY

## 2019-05-10 RX ORDER — ESCITALOPRAM OXALATE 10 MG/1
TABLET ORAL
Qty: 90 TABLET | Refills: 1 | Status: SHIPPED | OUTPATIENT
Start: 2019-05-10 | End: 2019-06-17 | Stop reason: SDUPTHER

## 2019-05-10 RX ORDER — RISPERIDONE 0.5 MG/1
0.5 TABLET ORAL NIGHTLY
Qty: 90 TABLET | Refills: 1 | Status: SHIPPED | OUTPATIENT
Start: 2019-05-10 | End: 2019-06-17 | Stop reason: ALTCHOICE

## 2019-06-03 ENCOUNTER — TELEPHONE (OUTPATIENT)
Dept: BEHAVIORAL HEALTH | Age: 84
End: 2019-06-03

## 2019-06-03 RX ORDER — DOXEPIN HYDROCHLORIDE 6 MG/1
6 TABLET ORAL NIGHTLY
Qty: 30 TABLET | Refills: 0 | Status: SHIPPED | OUTPATIENT
Start: 2019-06-03 | End: 2019-06-17 | Stop reason: ALTCHOICE

## 2019-06-14 ENCOUNTER — TELEPHONE (OUTPATIENT)
Dept: BEHAVIORAL HEALTH | Age: 84
End: 2019-06-14

## 2019-06-17 ENCOUNTER — OFFICE VISIT (OUTPATIENT)
Dept: BEHAVIORAL HEALTH | Age: 84
End: 2019-06-17

## 2019-06-17 ENCOUNTER — TELEPHONE (OUTPATIENT)
Dept: BEHAVIORAL HEALTH | Age: 84
End: 2019-06-17

## 2019-06-17 DIAGNOSIS — F42.9 OBSESSIVE-COMPULSIVE DISORDER, UNSPECIFIED TYPE: ICD-10-CM

## 2019-06-17 DIAGNOSIS — F34.1 PERSISTENT DEPRESSIVE DISORDER: ICD-10-CM

## 2019-06-17 DIAGNOSIS — F41.1 GENERALIZED ANXIETY DISORDER: Primary | ICD-10-CM

## 2019-06-17 PROCEDURE — 99214 OFFICE O/P EST MOD 30 MIN: CPT | Performed by: PSYCHIATRY & NEUROLOGY

## 2019-06-17 RX ORDER — ALPRAZOLAM 0.25 MG/1
0.25 TABLET ORAL NIGHTLY
Qty: 30 TABLET | Refills: 2 | Status: SHIPPED | OUTPATIENT
Start: 2019-06-17 | End: 2019-09-18 | Stop reason: SDUPTHER

## 2019-06-17 RX ORDER — ALPRAZOLAM 0.25 MG/1
0.25 TABLET ORAL NIGHTLY
Qty: 30 TABLET | Refills: 1 | Status: SHIPPED | OUTPATIENT
Start: 2019-06-17 | End: 2019-06-17 | Stop reason: SDUPTHER

## 2019-06-17 RX ORDER — ESCITALOPRAM OXALATE 10 MG/1
TABLET ORAL
Qty: 90 TABLET | Refills: 1 | Status: SHIPPED | OUTPATIENT
Start: 2019-06-17

## 2019-07-11 ENCOUNTER — TELEPHONE (OUTPATIENT)
Dept: BEHAVIORAL HEALTH | Age: 84
End: 2019-07-11

## 2019-09-19 RX ORDER — ALPRAZOLAM 0.25 MG/1
0.25 TABLET ORAL NIGHTLY
Qty: 30 TABLET | Refills: 2 | Status: SHIPPED | OUTPATIENT
Start: 2019-09-19 | End: 2019-12-13 | Stop reason: SDUPTHER

## 2019-10-16 RX ORDER — ALPRAZOLAM 0.25 MG/1
0.25 TABLET ORAL NIGHTLY
Qty: 90 TABLET | Refills: 0 | Status: CANCELLED | OUTPATIENT
Start: 2019-10-16

## 2019-10-16 RX ORDER — ALPRAZOLAM 0.25 MG/1
0.25 TABLET ORAL NIGHTLY
Qty: 30 TABLET | Refills: 2 | Status: CANCELLED | OUTPATIENT
Start: 2019-10-16

## 2019-11-13 RX ORDER — ALPRAZOLAM 0.25 MG/1
0.25 TABLET ORAL NIGHTLY
Qty: 30 TABLET | Refills: 2 | Status: CANCELLED | OUTPATIENT
Start: 2019-11-13

## 2019-12-13 RX ORDER — ALPRAZOLAM 0.25 MG/1
0.25 TABLET ORAL NIGHTLY
Qty: 30 TABLET | Refills: 2 | Status: SHIPPED | OUTPATIENT
Start: 2019-12-13 | End: 2020-07-02 | Stop reason: SDUPTHER

## 2020-02-19 ENCOUNTER — APPOINTMENT (OUTPATIENT)
Dept: BEHAVIORAL HEALTH | Age: 85
End: 2020-02-19

## 2020-07-01 ENCOUNTER — APPOINTMENT (OUTPATIENT)
Dept: BEHAVIORAL HEALTH | Age: 85
End: 2020-07-01

## 2020-07-03 RX ORDER — ALPRAZOLAM 0.25 MG/1
0.25 TABLET ORAL NIGHTLY
Qty: 30 TABLET | Refills: 0 | Status: SHIPPED | OUTPATIENT
Start: 2020-07-03 | End: 2020-08-04 | Stop reason: SDUPTHER

## 2020-08-06 RX ORDER — ALPRAZOLAM 0.25 MG/1
0.25 TABLET ORAL NIGHTLY
Qty: 30 TABLET | Refills: 0 | Status: SHIPPED | OUTPATIENT
Start: 2020-08-06

## 2020-08-19 ENCOUNTER — APPOINTMENT (OUTPATIENT)
Dept: CT IMAGING | Facility: HOSPITAL | Age: 85
DRG: 178 | End: 2020-08-19
Attending: EMERGENCY MEDICINE
Payer: MEDICARE

## 2020-08-19 ENCOUNTER — APPOINTMENT (OUTPATIENT)
Dept: GENERAL RADIOLOGY | Facility: HOSPITAL | Age: 85
DRG: 178 | End: 2020-08-19
Attending: EMERGENCY MEDICINE
Payer: MEDICARE

## 2020-08-19 ENCOUNTER — HOSPITAL ENCOUNTER (INPATIENT)
Facility: HOSPITAL | Age: 85
LOS: 8 days | Discharge: NURSING, REHAB OR LONG-TERM CARE | DRG: 178 | End: 2020-09-03
Attending: EMERGENCY MEDICINE | Admitting: INTERNAL MEDICINE
Payer: MEDICARE

## 2020-08-19 DIAGNOSIS — Z71.89 GOALS OF CARE, COUNSELING/DISCUSSION: ICD-10-CM

## 2020-08-19 DIAGNOSIS — R41.82 ALTERED MENTAL STATUS, UNSPECIFIED ALTERED MENTAL STATUS TYPE: ICD-10-CM

## 2020-08-19 DIAGNOSIS — E87.1 HYPONATREMIA: ICD-10-CM

## 2020-08-19 DIAGNOSIS — E87.6 HYPOKALEMIA: ICD-10-CM

## 2020-08-19 DIAGNOSIS — R79.89 AZOTEMIA: ICD-10-CM

## 2020-08-19 DIAGNOSIS — Z51.5 PALLIATIVE CARE ENCOUNTER: ICD-10-CM

## 2020-08-19 DIAGNOSIS — U07.1 COVID-19: Primary | ICD-10-CM

## 2020-08-19 LAB
ALBUMIN SERPL-MCNC: 3 G/DL (ref 3.4–5)
ALBUMIN/GLOB SERPL: 0.8 {RATIO} (ref 1–2)
ALP LIVER SERPL-CCNC: 74 U/L (ref 55–142)
ALT SERPL-CCNC: 24 U/L (ref 13–56)
ANION GAP SERPL CALC-SCNC: 6 MMOL/L (ref 0–18)
AST SERPL-CCNC: 29 U/L (ref 15–37)
BASOPHILS # BLD AUTO: 0.02 X10(3) UL (ref 0–0.2)
BASOPHILS NFR BLD AUTO: 0.4 %
BILIRUB SERPL-MCNC: 0.3 MG/DL (ref 0.1–2)
BILIRUB UR QL STRIP.AUTO: NEGATIVE
BUN BLD-MCNC: 26 MG/DL (ref 7–18)
BUN/CREAT SERPL: 21.1 (ref 10–20)
CALCIUM BLD-MCNC: 8.4 MG/DL (ref 8.5–10.1)
CHLORIDE SERPL-SCNC: 100 MMOL/L (ref 98–112)
CLARITY UR REFRACT.AUTO: CLEAR
CO2 SERPL-SCNC: 26 MMOL/L (ref 21–32)
COLOR UR AUTO: YELLOW
CREAT BLD-MCNC: 1.23 MG/DL (ref 0.55–1.02)
DEPRECATED RDW RBC AUTO: 38.8 FL (ref 35.1–46.3)
EOSINOPHIL # BLD AUTO: 0.05 X10(3) UL (ref 0–0.7)
EOSINOPHIL NFR BLD AUTO: 0.9 %
ERYTHROCYTE [DISTWIDTH] IN BLOOD BY AUTOMATED COUNT: 12.7 % (ref 11–15)
GLOBULIN PLAS-MCNC: 4 G/DL (ref 2.8–4.4)
GLUCOSE BLD-MCNC: 126 MG/DL (ref 70–99)
GLUCOSE UR STRIP.AUTO-MCNC: NEGATIVE MG/DL
HCT VFR BLD AUTO: 39.7 % (ref 35–48)
HGB BLD-MCNC: 13.7 G/DL (ref 12–16)
IMM GRANULOCYTES # BLD AUTO: 0.02 X10(3) UL (ref 0–1)
IMM GRANULOCYTES NFR BLD: 0.4 %
KETONES UR STRIP.AUTO-MCNC: NEGATIVE MG/DL
LACTATE SERPL-SCNC: 1.9 MMOL/L (ref 0.4–2)
LEUKOCYTE ESTERASE UR QL STRIP.AUTO: NEGATIVE
LYMPHOCYTES # BLD AUTO: 0.88 X10(3) UL (ref 1–4)
LYMPHOCYTES NFR BLD AUTO: 15.9 %
M PROTEIN MFR SERPL ELPH: 7 G/DL (ref 6.4–8.2)
MCH RBC QN AUTO: 29.5 PG (ref 26–34)
MCHC RBC AUTO-ENTMCNC: 34.5 G/DL (ref 31–37)
MCV RBC AUTO: 85.4 FL (ref 80–100)
MONOCYTES # BLD AUTO: 0.83 X10(3) UL (ref 0.1–1)
MONOCYTES NFR BLD AUTO: 15 %
NEUTROPHILS # BLD AUTO: 3.73 X10 (3) UL (ref 1.5–7.7)
NEUTROPHILS # BLD AUTO: 3.73 X10(3) UL (ref 1.5–7.7)
NEUTROPHILS NFR BLD AUTO: 67.4 %
NITRITE UR QL STRIP.AUTO: NEGATIVE
OSMOLALITY SERPL CALC.SUM OF ELEC: 280 MOSM/KG (ref 275–295)
PH UR STRIP.AUTO: 5 [PH] (ref 4.5–8)
PLATELET # BLD AUTO: 190 10(3)UL (ref 150–450)
POTASSIUM SERPL-SCNC: 3.1 MMOL/L (ref 3.5–5.1)
PROT UR STRIP.AUTO-MCNC: NEGATIVE MG/DL
RBC # BLD AUTO: 4.65 X10(6)UL (ref 3.8–5.3)
RBC UR QL AUTO: NEGATIVE
SARS-COV-2 RNA RESP QL NAA+PROBE: DETECTED
SODIUM SERPL-SCNC: 132 MMOL/L (ref 136–145)
SP GR UR STRIP.AUTO: 1.01 (ref 1–1.03)
TROPONIN I SERPL-MCNC: <0.045 NG/ML (ref ?–0.04)
UROBILINOGEN UR STRIP.AUTO-MCNC: <2 MG/DL
WBC # BLD AUTO: 5.5 X10(3) UL (ref 4–11)

## 2020-08-19 PROCEDURE — 71045 X-RAY EXAM CHEST 1 VIEW: CPT | Performed by: EMERGENCY MEDICINE

## 2020-08-19 PROCEDURE — 70450 CT HEAD/BRAIN W/O DYE: CPT | Performed by: EMERGENCY MEDICINE

## 2020-08-19 RX ORDER — SODIUM CHLORIDE 9 MG/ML
50 INJECTION, SOLUTION INTRAVENOUS CONTINUOUS
Status: DISCONTINUED | OUTPATIENT
Start: 2020-08-19 | End: 2020-08-22

## 2020-08-20 PROBLEM — E87.1 HYPONATREMIA: Status: ACTIVE | Noted: 2020-08-20

## 2020-08-20 PROBLEM — R79.89 AZOTEMIA: Status: ACTIVE | Noted: 2020-08-20

## 2020-08-20 PROBLEM — R41.82 ALTERED MENTAL STATUS, UNSPECIFIED ALTERED MENTAL STATUS TYPE: Status: ACTIVE | Noted: 2020-08-20

## 2020-08-20 PROBLEM — E87.6 HYPOKALEMIA: Status: ACTIVE | Noted: 2020-08-20

## 2020-08-20 LAB
ALBUMIN SERPL-MCNC: 3 G/DL (ref 3.4–5)
ALBUMIN/GLOB SERPL: 0.8 {RATIO} (ref 1–2)
ALP LIVER SERPL-CCNC: 61 U/L (ref 55–142)
ALT SERPL-CCNC: 23 U/L (ref 13–56)
ANION GAP SERPL CALC-SCNC: 6 MMOL/L (ref 0–18)
AST SERPL-CCNC: 27 U/L (ref 15–37)
ATRIAL RATE: 88 BPM
BILIRUB SERPL-MCNC: 0.4 MG/DL (ref 0.1–2)
BILIRUB UR QL STRIP.AUTO: NEGATIVE
BUN BLD-MCNC: 17 MG/DL (ref 7–18)
BUN/CREAT SERPL: 18.5 (ref 10–20)
CALCIUM BLD-MCNC: 7.9 MG/DL (ref 8.5–10.1)
CHLORIDE SERPL-SCNC: 102 MMOL/L (ref 98–112)
CK SERPL-CCNC: 418 U/L (ref 26–192)
CO2 SERPL-SCNC: 26 MMOL/L (ref 21–32)
COLOR UR AUTO: YELLOW
CREAT BLD-MCNC: 0.92 MG/DL (ref 0.55–1.02)
GLOBULIN PLAS-MCNC: 3.6 G/DL (ref 2.8–4.4)
GLUCOSE BLD-MCNC: 103 MG/DL (ref 70–99)
GLUCOSE UR STRIP.AUTO-MCNC: NEGATIVE MG/DL
KETONES UR STRIP.AUTO-MCNC: NEGATIVE MG/DL
M PROTEIN MFR SERPL ELPH: 6.6 G/DL (ref 6.4–8.2)
NITRITE UR QL STRIP.AUTO: NEGATIVE
NT-PROBNP SERPL-MCNC: 272 PG/ML (ref ?–450)
OSMOLALITY SERPL CALC.SUM OF ELEC: 280 MOSM/KG (ref 275–295)
P AXIS: 31 DEGREES
P-R INTERVAL: 164 MS
PH UR STRIP.AUTO: 7 [PH] (ref 4.5–8)
POTASSIUM SERPL-SCNC: 2.9 MMOL/L (ref 3.5–5.1)
PROCALCITONIN SERPL-MCNC: <0.05 NG/ML (ref ?–0.16)
PROT UR STRIP.AUTO-MCNC: NEGATIVE MG/DL
Q-T INTERVAL: 426 MS
QRS DURATION: 62 MS
QTC CALCULATION (BEZET): 515 MS
R AXIS: 1 DEGREES
SODIUM SERPL-SCNC: 134 MMOL/L (ref 136–145)
SP GR UR STRIP.AUTO: 1.01 (ref 1–1.03)
T AXIS: 83 DEGREES
TROPONIN I SERPL-MCNC: <0.045 NG/ML (ref ?–0.04)
TROPONIN I SERPL-MCNC: <0.045 NG/ML (ref ?–0.04)
UROBILINOGEN UR STRIP.AUTO-MCNC: <2 MG/DL
VENTRICULAR RATE: 88 BPM

## 2020-08-20 RX ORDER — ESCITALOPRAM OXALATE 10 MG/1
10 TABLET ORAL DAILY
Status: DISCONTINUED | OUTPATIENT
Start: 2020-08-20 | End: 2020-08-25

## 2020-08-20 RX ORDER — POLYETHYLENE GLYCOL 3350 17 G/17G
17 POWDER, FOR SOLUTION ORAL DAILY PRN
Status: DISCONTINUED | OUTPATIENT
Start: 2020-08-20 | End: 2020-08-21

## 2020-08-20 RX ORDER — MUSCLE RUB CREAM 100; 150 MG/G; MG/G
1 CREAM TOPICAL 2 TIMES DAILY
Status: DISCONTINUED | OUTPATIENT
Start: 2020-08-20 | End: 2020-09-04

## 2020-08-20 RX ORDER — SENNOSIDES 8.6 MG
8.6 TABLET ORAL DAILY
COMMUNITY

## 2020-08-20 RX ORDER — SENNOSIDES 8.6 MG
8.6 TABLET ORAL DAILY
Status: DISCONTINUED | OUTPATIENT
Start: 2020-08-20 | End: 2020-09-04

## 2020-08-20 RX ORDER — ACETAMINOPHEN 325 MG/1
325 TABLET ORAL EVERY 6 HOURS PRN
COMMUNITY

## 2020-08-20 RX ORDER — ALPRAZOLAM 0.25 MG/1
0.25 TABLET ORAL NIGHTLY PRN
Status: DISCONTINUED | OUTPATIENT
Start: 2020-08-20 | End: 2020-09-04

## 2020-08-20 RX ORDER — AMLODIPINE BESYLATE 5 MG/1
5 TABLET ORAL 2 TIMES DAILY
Status: DISCONTINUED | OUTPATIENT
Start: 2020-08-20 | End: 2020-08-27

## 2020-08-20 RX ORDER — ONDANSETRON 2 MG/ML
4 INJECTION INTRAMUSCULAR; INTRAVENOUS EVERY 6 HOURS PRN
Status: DISCONTINUED | OUTPATIENT
Start: 2020-08-20 | End: 2020-09-04

## 2020-08-20 RX ORDER — POLYETHYLENE GLYCOL 3350 17 G/17G
17 POWDER, FOR SOLUTION ORAL DAILY PRN
COMMUNITY

## 2020-08-20 RX ORDER — ECHINACEA PURPUREA EXTRACT 125 MG
1 TABLET ORAL AS NEEDED
COMMUNITY

## 2020-08-20 RX ORDER — ALPRAZOLAM 0.25 MG/1
0.25 TABLET ORAL NIGHTLY PRN
COMMUNITY

## 2020-08-20 RX ORDER — ENTECAVIR 0.5 MG/1
0.5 TABLET, FILM COATED ORAL
Status: DISCONTINUED | OUTPATIENT
Start: 2020-08-20 | End: 2020-08-21 | Stop reason: RX

## 2020-08-20 RX ORDER — CALCIUM CARBONATE 200(500)MG
500 TABLET,CHEWABLE ORAL EVERY 6 HOURS PRN
Status: DISCONTINUED | OUTPATIENT
Start: 2020-08-20 | End: 2020-09-04

## 2020-08-20 RX ORDER — CALCIUM CARBONATE 200(500)MG
1 TABLET,CHEWABLE ORAL EVERY 6 HOURS PRN
COMMUNITY

## 2020-08-20 RX ORDER — ACETAMINOPHEN 325 MG/1
650 TABLET ORAL EVERY 6 HOURS PRN
Status: DISCONTINUED | OUTPATIENT
Start: 2020-08-20 | End: 2020-09-04

## 2020-08-20 RX ORDER — POTASSIUM CHLORIDE 14.9 MG/ML
20 INJECTION INTRAVENOUS ONCE
Status: COMPLETED | OUTPATIENT
Start: 2020-08-20 | End: 2020-08-20

## 2020-08-20 RX ORDER — HEPARIN SODIUM 5000 [USP'U]/ML
5000 INJECTION, SOLUTION INTRAVENOUS; SUBCUTANEOUS EVERY 12 HOURS SCHEDULED
Status: DISCONTINUED | OUTPATIENT
Start: 2020-08-20 | End: 2020-08-31

## 2020-08-20 RX ORDER — ALLOPURINOL 300 MG/1
TABLET ORAL 2 TIMES DAILY
COMMUNITY

## 2020-08-20 RX ORDER — GUAIFENESIN 100 MG/5ML
100 SOLUTION ORAL EVERY 6 HOURS PRN
COMMUNITY

## 2020-08-20 RX ORDER — MAGNESIUM OXIDE 400 MG (241.3 MG MAGNESIUM) TABLET
2.5 TABLET NIGHTLY
Status: DISCONTINUED | OUTPATIENT
Start: 2020-08-20 | End: 2020-09-04

## 2020-08-20 NOTE — ED PROVIDER NOTES
Patient Seen in: [facility] Emergency Department      History   Patient presents with:  Altered Mental Status  Fall    Stated Complaint: [address] staff called ems for ams after fall at 0500 wednesday    HPI    This is a pleasant 80-year-old femal left cemented bipolar hip replacement per Dr. Mendoza Plant   • LEFT PHACOEMULSIFICATION OF CATARACT WITH INTRAOCULAR LENS IMPLANT 75444 Left 12/16/2015    Performed by Justice Rubinstein, MD at [address] [place] [address]   • RIGHT PHACOEMULSIFICATION OF CATARACT WIT American 40 (*)     GFR, -American 46 (*)     Albumin 3.0 (*)     A/G Ratio 0.8 (*)     All other components within normal limits   RAPID SARS-COV-2 BY PCR - Abnormal; Notable for the following components:    Rapid SARS-CoV-2 by PCR Detected (*)

## 2020-08-20 NOTE — ED INITIAL ASSESSMENT (HPI)
Staff at [address] [place] [address] called ems for ams/not as talkative and moving less after fall in nh at 0500 Wednesday  per ems     bld sugar was 112 per ems   Son notified and will meet pt here

## 2020-08-20 NOTE — PLAN OF CARE
Patient is alert, but mostly drowsy, rouses easily at verbal stimulation. Maintains O2 sats > 90% on room air. Tele - NSR. Mandarin speaking, patient is baseline confused due to hx of dementia, confirmed via two daughters & a granddaughter.  Behaviorally ap

## 2020-08-20 NOTE — CONSULTS
INFECTIOUS DISEASE CONSULTATION    [place] Yuliya Mcclain Patient Status:  Observation    10/27/1934 MRN [id number]   [facility] 5NW-A Attending Belinda Ramsay MD   Hosp Day # 0 PCP Mk Ramirez MD [facility] MAIN OR   • HIP REPLACEMENT SURGERY Left 08/05/2018    left cemented bipolar hip replacement per Dr. Tyrone Bradley   • LEFT PHACOEMULSIFICATION OF CATARACT WITH INTRAOCULAR LENS IMPLANT 03124 Left 12/16/2015    Performed by Cuauhtemoc Kang MD at [address] [place] [address] Drive premix 20 mEq, 20 mEq, Intravenous, Once  •  0.9% NaCl infusion, 125 mL/hr, Intravenous, Continuous  No current facility-administered medications on file prior to encounter. ALPRAZolam 0.25 MG Oral Tab, Take 0.25 mg by mouth nightly as needed. , Disp: , R nontender, nondistended. Musculoskeletal: Full range of motion of all extremities. No swelling noted. Joints: no effusions  Skin: No lesions.  No erythema, no open wounds      Laboratory Data:  Laboratory data reviewed      Recent Labs   Lab 08/19/20 Anxiety and depression     Multiple falls     Status post left hip replacement     DDD (degenerative disc disease), lumbar     DDD (degenerative disc disease), cervical     COVID-19     Altered mental status, unspecified altered mental status type     Hypo

## 2020-08-20 NOTE — PLAN OF CARE
A/O x1, mandrin speaking. Vital signs stable. Tolerating RA with saturations of >92%. Tele-NSR. Incontinent at times, briefed. No complaints of pain. Up with lift, wheelchair bound at baseline. IVF. Video monitoring in place. Safety precautions in place.  P

## 2020-08-20 NOTE — PROGRESS NOTES
NURSING ADMISSION NOTE      Patient admitted via Cart  Oriented to room. 519  Safety precautions initiated. Bed in low position. Call light in reach. Admission completed as much as possible with nursing home paperwork.

## 2020-08-20 NOTE — PHYSICAL THERAPY NOTE
PT order received, chart reviewed. D/w RN. Pt presents from LTC, is w/c bound at baseline. No skilled PT intervention is required at this time. Rec return to LTC at [place].

## 2020-08-20 NOTE — ED NOTES
Pt's son reports pt has had memory issues for over 5 yrs and hx of psychosis x 15 yrs. Living in [address] [place] [address] since 01/2018.

## 2020-08-21 LAB
ANION GAP SERPL CALC-SCNC: 7 MMOL/L (ref 0–18)
BASOPHILS # BLD AUTO: 0.01 X10(3) UL (ref 0–0.2)
BASOPHILS NFR BLD AUTO: 0.2 %
BUN BLD-MCNC: 9 MG/DL (ref 7–18)
BUN/CREAT SERPL: 11.8 (ref 10–20)
CALCIUM BLD-MCNC: 7.2 MG/DL (ref 8.5–10.1)
CHLORIDE SERPL-SCNC: 104 MMOL/L (ref 98–112)
CO2 SERPL-SCNC: 23 MMOL/L (ref 21–32)
CREAT BLD-MCNC: 0.76 MG/DL (ref 0.55–1.02)
CRP SERPL-MCNC: 4.06 MG/DL (ref ?–0.3)
D-DIMER: 0.6 UG/ML FEU (ref ?–0.85)
DEPRECATED HBV CORE AB SER IA-ACNC: 264.2 NG/ML (ref 18–340)
DEPRECATED RDW RBC AUTO: 40.7 FL (ref 35.1–46.3)
EOSINOPHIL # BLD AUTO: 0.01 X10(3) UL (ref 0–0.7)
EOSINOPHIL NFR BLD AUTO: 0.2 %
ERYTHROCYTE [DISTWIDTH] IN BLOOD BY AUTOMATED COUNT: 12.4 % (ref 11–15)
GLUCOSE BLD-MCNC: 115 MG/DL (ref 70–99)
HCT VFR BLD AUTO: 43 % (ref 35–48)
HGB BLD-MCNC: 14.3 G/DL (ref 12–16)
IMM GRANULOCYTES # BLD AUTO: 0.02 X10(3) UL (ref 0–1)
IMM GRANULOCYTES NFR BLD: 0.4 %
LDH SERPL L TO P-CCNC: 239 U/L
LYMPHOCYTES # BLD AUTO: 1 X10(3) UL (ref 1–4)
LYMPHOCYTES NFR BLD AUTO: 20.2 %
MCH RBC QN AUTO: 29.5 PG (ref 26–34)
MCHC RBC AUTO-ENTMCNC: 33.3 G/DL (ref 31–37)
MCV RBC AUTO: 88.7 FL (ref 80–100)
MONOCYTES # BLD AUTO: 0.58 X10(3) UL (ref 0.1–1)
MONOCYTES NFR BLD AUTO: 11.7 %
NEUTROPHILS # BLD AUTO: 3.34 X10 (3) UL (ref 1.5–7.7)
NEUTROPHILS # BLD AUTO: 3.34 X10(3) UL (ref 1.5–7.7)
NEUTROPHILS NFR BLD AUTO: 67.3 %
OSMOLALITY SERPL CALC.SUM OF ELEC: 278 MOSM/KG (ref 275–295)
PLATELET # BLD AUTO: 187 10(3)UL (ref 150–450)
POTASSIUM SERPL-SCNC: 2.8 MMOL/L (ref 3.5–5.1)
POTASSIUM SERPL-SCNC: 3.4 MMOL/L (ref 3.5–5.1)
RBC # BLD AUTO: 4.85 X10(6)UL (ref 3.8–5.3)
SODIUM SERPL-SCNC: 134 MMOL/L (ref 136–145)
WBC # BLD AUTO: 5 X10(3) UL (ref 4–11)

## 2020-08-21 RX ORDER — POTASSIUM CHLORIDE 14.9 MG/ML
20 INJECTION INTRAVENOUS ONCE
Status: COMPLETED | OUTPATIENT
Start: 2020-08-21 | End: 2020-08-21

## 2020-08-21 RX ORDER — LAMIVUDINE 10 MG/ML
100 SOLUTION ORAL DAILY
Status: DISCONTINUED | OUTPATIENT
Start: 2020-08-21 | End: 2020-09-04

## 2020-08-21 RX ORDER — POLYETHYLENE GLYCOL 3350 17 G/17G
17 POWDER, FOR SOLUTION ORAL 2 TIMES DAILY PRN
Status: DISCONTINUED | OUTPATIENT
Start: 2020-08-21 | End: 2020-08-21

## 2020-08-21 RX ORDER — DIAPER,BRIEF,INFANT-TODD,DISP
EACH MISCELLANEOUS 2 TIMES DAILY
Status: DISCONTINUED | OUTPATIENT
Start: 2020-08-21 | End: 2020-09-04

## 2020-08-21 NOTE — PROGRESS NOTES
[facility]  Progress Note    [address] [facility] Patient Status:  Observation    10/27/1934 MRN [id number]   [facility] 5NW-A Attending Saulo Negron MD   Hosp Day # 0 PCP Ketty Landry MD       SUBJECTIVE:  No CP, SOB  Temp Max 99.7  OBJECTI day  0.9% NaCl infusion, 125 mL/hr, Intravenous, Continuous        Exam:  Gen: No acute distress, alert , no focal neurologic deficits  Pulm: Lungs clear, normal respiratory effort  CV: Heart with regular rate and rhythm, no peripheral edema  Abd: Abdomen mental status, unspecified altered mental status type     Hyponatremia     Hypokalemia     Azotemia       Questions/concerns were discussed with patient and/or family by bedside.     Indra Self  8/21/2020  8:28 AM

## 2020-08-21 NOTE — PLAN OF CARE
A/O x1, mandrin speaking. Vital signs stable. Tolerating RA with saturations of >92%. Tele-NSR. Purewick in place draining clear, yellow urine. No complaints of pain. Up with lift, wheelchair bound at baseline. IVF. Video monitoring in place.  Safety precau

## 2020-08-21 NOTE — PLAN OF CARE
Patient is AO x 1, more awake & interactive today. Maintains O2 sats > 90% on room air. Tele - NSR, brief run of SVT x 1. K+ being replaced. IVF reduced. Feed assist for meals. Incontinent of bowel, purwick for urinary incontinence.  Kept clean & dry, turne interventions   Outcome: Progressing  Goal: Patient/Family Short Term Goal  Description  Patient's Short Term Goal:   8/20: Sleep well  8/20 AM: better PO intake  8/20 NOC: Sleep well and remain safe from falls  Interventions:   - PRN sleep aids  -Make pt

## 2020-08-21 NOTE — PROGRESS NOTES
[facility]                INFECTIOUS DISEASE PROGRESS NOTE    Tim Mcclain Patient Status:  Observation    10/27/1934 MRN [id number]   [facility] 5NW-A Attending Mikaela Bailey MD   Hosp Day # 0 PCP Tree Chapman MD       [place] on 08/19/20   1.  BLOOD CULTURE     Status: None (Preliminary result)    Collection Time: 08/19/20 10:34 PM   Result Value Ref Range    Blood Culture Result No Growth 1 Day N/A           Problem list reviewed:  Patient Active Problem List:     Chronic hepat

## 2020-08-21 NOTE — H&P
[address] Patient Status:  Observation    10/27/1934 MRN [id number]   [facility] 5NW-A Attending Chidi Milner MD   Hosp Day # 0 PCP Kae Lemus MD     History of Present Illness:  Cecilia Nugent is at [facility] MAIN OR   • HIP REPLACEMENT SURGERY Left 08/05/2018    left cemented bipolar hip replacement per Dr. Sai Lopez   • LEFT PHACOEMULSIFICATION OF CATARACT WITH INTRAOCULAR LENS IMPLANT [id number] Left 12/16/2015    Performed by Jeanne Davies MD at [place] [address] mouth., Disp: , Rfl: , Taking        Review of Systems:  A comprehensive 10 point review of systems not  Completed because of language barrier. Pertinent positives and negatives noted in the the HPI.     Physical Exam:  Vital signs: Blood pressure 131/76, transmitted to the  [facility]) [address]) which includes the Dose Index Registry. PATIENT STATED HISTORY: (As transcribed by Technologist)  Patient is AMS with a history of dementia.     FINDINGS:  VENTR normal pressure hydrocephalus.     Dictated by (CST): Mortimer Moots, MD on 8/19/2020 at 11:32 PM     Finalized by (CST): Mortimer Moots, MD on 8/19/2020 at 11:37 PM       Xr Chest Ap Portable  (cpt=71045)    Result Date: 8/19/2020  PROCEDURE:  XR HAI (degenerative disc disease), lumbar     DDD (degenerative disc disease), cervical     COVID-19     Altered mental status, unspecified altered mental status type     Hyponatremia     Hypokalemia     Azotemia        Indra Self  8/20/2020  10:03 PM

## 2020-08-21 NOTE — OCCUPATIONAL THERAPY NOTE
OT orders received via functional mobility screening. Chart reviewed and noted patient is a LT NH resident and has assistance with all ADLs and is w/c bound. No immediate skilled OT needs identified at this time. Will sign off.

## 2020-08-21 NOTE — CM/SW NOTE
08/21/20 1400   CM/SW Referral Data   Referral Source Social Work (self-referral)   Reason for Referral Discharge [address] Name [facility] Na   Discharge

## 2020-08-22 LAB
BASOPHILS # BLD AUTO: 0.02 X10(3) UL (ref 0–0.2)
BASOPHILS NFR BLD AUTO: 0.3 %
CRP SERPL-MCNC: 5.85 MG/DL (ref ?–0.3)
D-DIMER: 0.6 UG/ML FEU (ref ?–0.85)
DEPRECATED HBV CORE AB SER IA-ACNC: 347.1 NG/ML (ref 18–340)
DEPRECATED RDW RBC AUTO: 39.2 FL (ref 35.1–46.3)
EOSINOPHIL # BLD AUTO: 0.01 X10(3) UL (ref 0–0.7)
EOSINOPHIL NFR BLD AUTO: 0.1 %
ERYTHROCYTE [DISTWIDTH] IN BLOOD BY AUTOMATED COUNT: 12.3 % (ref 11–15)
HCT VFR BLD AUTO: 43.1 % (ref 35–48)
HGB BLD-MCNC: 14.4 G/DL (ref 12–16)
IMM GRANULOCYTES # BLD AUTO: 0.06 X10(3) UL (ref 0–1)
IMM GRANULOCYTES NFR BLD: 0.8 %
LDH SERPL L TO P-CCNC: 270 U/L
LYMPHOCYTES # BLD AUTO: 1.03 X10(3) UL (ref 1–4)
LYMPHOCYTES NFR BLD AUTO: 13.8 %
MCH RBC QN AUTO: 29 PG (ref 26–34)
MCHC RBC AUTO-ENTMCNC: 33.4 G/DL (ref 31–37)
MCV RBC AUTO: 86.7 FL (ref 80–100)
MONOCYTES # BLD AUTO: 0.82 X10(3) UL (ref 0.1–1)
MONOCYTES NFR BLD AUTO: 11 %
NEUTROPHILS # BLD AUTO: 5.54 X10 (3) UL (ref 1.5–7.7)
NEUTROPHILS # BLD AUTO: 5.54 X10(3) UL (ref 1.5–7.7)
NEUTROPHILS NFR BLD AUTO: 74 %
PLATELET # BLD AUTO: 215 10(3)UL (ref 150–450)
POTASSIUM SERPL-SCNC: 3.3 MMOL/L (ref 3.5–5.1)
RBC # BLD AUTO: 4.97 X10(6)UL (ref 3.8–5.3)
WBC # BLD AUTO: 7.5 X10(3) UL (ref 4–11)

## 2020-08-22 NOTE — PROGRESS NOTES
[facility]  Progress Note    [address] [facility] Patient Status:  Observation    10/27/1934 MRN [id number]   [facility] 5NW-A Attending Deepthi Sanchez MD   Hosp Day # 0 PCP Oj Gutierrez MD       SUBJECTIVE:  Afebrile;e, eating better   MS carlos day        Exam:  Gen: No acute distress, alert , no focal neurologic deficits  Pulm: Lungs clear, normal respiratory effort  CV: Heart with regular rate and rhythm, no peripheral edema  Abd: Abdomen soft, nontender, nondistended, no organomegaly, bowel so disease), lumbar     DDD (degenerative disc disease), cervical     COVID-19     Altered mental status, unspecified altered mental status type     Hyponatremia     Hypokalemia     Azotemia       Questions/concerns were discussed with patient and/or family b

## 2020-08-22 NOTE — PROGRESS NOTES
[facility]                INFECTIOUS DISEASE PROGRESS NOTE    Tim Mcclain Patient Status:  Observation    10/27/1934 MRN [id number]   [facility] 5NW-A Attending Saulo Negron MD   Hosp Day # 0 PCP MD Heather Dias 2. 9* 2.8* 3.4* 3.3*    102 104  --   --    CO2 26.0 26.0 23.0  --   --    ALKPHO 74 61  --   --   --    AST 29 27  --   --   --    ALT 24 23  --   --   --    BILT 0.3 0.4  --   --   --    TP 7.0 6.6  --   --   --      Recent Labs   Lab 08/20/20  1828

## 2020-08-22 NOTE — PLAN OF CARE
Received patient a/ox1, per family patient is confused, Mandarin speaking only. She is pleasant and cooperative with care. O2 sats on RA 95%. Appears comfortable and in no distress.  Patient's daughter was updated on plan of care and verbalizes understandin

## 2020-08-23 LAB
BASOPHILS # BLD AUTO: 0.02 X10(3) UL (ref 0–0.2)
BASOPHILS NFR BLD AUTO: 0.3 %
CRP SERPL-MCNC: 6.97 MG/DL (ref ?–0.3)
D-DIMER: 0.57 UG/ML FEU (ref ?–0.85)
DEPRECATED HBV CORE AB SER IA-ACNC: 392.1 NG/ML (ref 18–340)
DEPRECATED RDW RBC AUTO: 38.8 FL (ref 35.1–46.3)
EOSINOPHIL # BLD AUTO: 0 X10(3) UL (ref 0–0.7)
EOSINOPHIL NFR BLD AUTO: 0 %
ERYTHROCYTE [DISTWIDTH] IN BLOOD BY AUTOMATED COUNT: 12.4 % (ref 11–15)
HCT VFR BLD AUTO: 43.2 % (ref 35–48)
HGB BLD-MCNC: 14.7 G/DL (ref 12–16)
IMM GRANULOCYTES # BLD AUTO: 0.02 X10(3) UL (ref 0–1)
IMM GRANULOCYTES NFR BLD: 0.3 %
LDH SERPL L TO P-CCNC: 308 U/L
LYMPHOCYTES # BLD AUTO: 0.9 X10(3) UL (ref 1–4)
LYMPHOCYTES NFR BLD AUTO: 12.7 %
MCH RBC QN AUTO: 29.3 PG (ref 26–34)
MCHC RBC AUTO-ENTMCNC: 34 G/DL (ref 31–37)
MCV RBC AUTO: 86.2 FL (ref 80–100)
MONOCYTES # BLD AUTO: 0.86 X10(3) UL (ref 0.1–1)
MONOCYTES NFR BLD AUTO: 12.1 %
NEUTROPHILS # BLD AUTO: 5.31 X10 (3) UL (ref 1.5–7.7)
NEUTROPHILS # BLD AUTO: 5.31 X10(3) UL (ref 1.5–7.7)
NEUTROPHILS NFR BLD AUTO: 74.6 %
PLATELET # BLD AUTO: 237 10(3)UL (ref 150–450)
POTASSIUM SERPL-SCNC: 3.2 MMOL/L (ref 3.5–5.1)
RBC # BLD AUTO: 5.01 X10(6)UL (ref 3.8–5.3)
WBC # BLD AUTO: 7.1 X10(3) UL (ref 4–11)

## 2020-08-23 RX ORDER — FLUTICASONE PROPIONATE 50 MCG
2 SPRAY, SUSPENSION (ML) NASAL DAILY
Status: DISCONTINUED | OUTPATIENT
Start: 2020-08-23 | End: 2020-09-04

## 2020-08-23 RX ORDER — DEXTROSE AND POTASSIUM CHLORIDE 5; .15 G/100ML; G/100ML
SOLUTION INTRAVENOUS CONTINUOUS
Status: DISCONTINUED | OUTPATIENT
Start: 2020-08-23 | End: 2020-08-25

## 2020-08-23 RX ORDER — POTASSIUM CHLORIDE 1.5 G/1.77G
40 POWDER, FOR SOLUTION ORAL EVERY 4 HOURS
Status: COMPLETED | OUTPATIENT
Start: 2020-08-23 | End: 2020-08-23

## 2020-08-23 NOTE — PROGRESS NOTES
[facility]  Progress Note    [address] [facility] Patient Status:  Observation    10/27/1934 MRN [id number]   [facility] 5NW-A Attending Drew Li MD   Hosp Day # 0 PCP Debi Moses MD       SUBJECTIVE:  Low grad fever   SaO2 100 % ANAMARIA Beckett respiratory effort  CV: Heart with regular rate and rhythm, no peripheral edema  Abd: Abdomen soft, nontender, nondistended, no organomegaly, bowel sounds present  MSK: Full range of motion in extremities, no clubbing, no cyanosis  Skin: no rashes or lesio depression     Multiple falls     Status post left hip replacement     DDD (degenerative disc disease), lumbar     DDD (degenerative disc disease), cervical     COVID-19     Altered mental status, unspecified altered mental status type     Hyponatremia

## 2020-08-24 LAB
ALBUMIN SERPL-MCNC: 2.7 G/DL (ref 3.4–5)
ALBUMIN/GLOB SERPL: 0.7 {RATIO} (ref 1–2)
ALP LIVER SERPL-CCNC: 59 U/L (ref 55–142)
ALT SERPL-CCNC: 33 U/L (ref 13–56)
ANION GAP SERPL CALC-SCNC: 7 MMOL/L (ref 0–18)
AST SERPL-CCNC: 44 U/L (ref 15–37)
BASOPHILS # BLD AUTO: 0.02 X10(3) UL (ref 0–0.2)
BASOPHILS NFR BLD AUTO: 0.3 %
BILIRUB SERPL-MCNC: 0.6 MG/DL (ref 0.1–2)
BUN BLD-MCNC: 14 MG/DL (ref 7–18)
BUN/CREAT SERPL: 17.3 (ref 10–20)
CALCIUM BLD-MCNC: 8 MG/DL (ref 8.5–10.1)
CHLORIDE SERPL-SCNC: 101 MMOL/L (ref 98–112)
CO2 SERPL-SCNC: 21 MMOL/L (ref 21–32)
CREAT BLD-MCNC: 0.81 MG/DL (ref 0.55–1.02)
CRP SERPL-MCNC: 7.92 MG/DL (ref ?–0.3)
DEPRECATED HBV CORE AB SER IA-ACNC: 449.9 NG/ML (ref 18–340)
DEPRECATED RDW RBC AUTO: 38.6 FL (ref 35.1–46.3)
EOSINOPHIL # BLD AUTO: 0 X10(3) UL (ref 0–0.7)
EOSINOPHIL NFR BLD AUTO: 0 %
ERYTHROCYTE [DISTWIDTH] IN BLOOD BY AUTOMATED COUNT: 12.4 % (ref 11–15)
GLOBULIN PLAS-MCNC: 3.9 G/DL (ref 2.8–4.4)
GLUCOSE BLD-MCNC: 126 MG/DL (ref 70–99)
HCT VFR BLD AUTO: 40.4 % (ref 35–48)
HGB BLD-MCNC: 13.7 G/DL (ref 12–16)
IMM GRANULOCYTES # BLD AUTO: 0.03 X10(3) UL (ref 0–1)
IMM GRANULOCYTES NFR BLD: 0.4 %
LDH SERPL L TO P-CCNC: 281 U/L
LYMPHOCYTES # BLD AUTO: 0.58 X10(3) UL (ref 1–4)
LYMPHOCYTES NFR BLD AUTO: 8.5 %
M PROTEIN MFR SERPL ELPH: 6.6 G/DL (ref 6.4–8.2)
MCH RBC QN AUTO: 29 PG (ref 26–34)
MCHC RBC AUTO-ENTMCNC: 33.9 G/DL (ref 31–37)
MCV RBC AUTO: 85.4 FL (ref 80–100)
MONOCYTES # BLD AUTO: 0.7 X10(3) UL (ref 0.1–1)
MONOCYTES NFR BLD AUTO: 10.2 %
NEUTROPHILS # BLD AUTO: 5.53 X10 (3) UL (ref 1.5–7.7)
NEUTROPHILS # BLD AUTO: 5.53 X10(3) UL (ref 1.5–7.7)
NEUTROPHILS NFR BLD AUTO: 80.6 %
OSMOLALITY SERPL CALC.SUM OF ELEC: 270 MOSM/KG (ref 275–295)
PLATELET # BLD AUTO: 242 10(3)UL (ref 150–450)
POTASSIUM SERPL-SCNC: 4.2 MMOL/L (ref 3.5–5.1)
POTASSIUM SERPL-SCNC: 4.8 MMOL/L (ref 3.5–5.1)
RBC # BLD AUTO: 4.73 X10(6)UL (ref 3.8–5.3)
SODIUM SERPL-SCNC: 129 MMOL/L (ref 136–145)
WBC # BLD AUTO: 6.9 X10(3) UL (ref 4–11)

## 2020-08-24 NOTE — PROGRESS NOTES
[facility]  Progress Note    [address] [facility] Patient Status:  Observation    10/27/1934 MRN [id number]   [facility] 5NW-A Attending Willy Zhu MD   Hosp Day # 0 PCP Sarah Ludwig MD       SUBJECTIVE:  Better today, eating better, still PRN  Heparin Sodium (Porcine) 5000 UNIT/ML injection 5,000 Units, 5,000 Units, Subcutaneous, 2 times per day        Exam:  Gen: No acute distress, alert , no focal neurologic deficits  Pulm: Lungs clear, normal respiratory effort  CV: Heart with regular ra hearing loss)     Seasonal allergies     Non-compliance     CKD (chronic kidney disease) stage 3, GFR 30-59 ml/min ([facility])     History of CVA (cerebrovascular accident)     Parana ([facility] [address]     Essential hypertension with goal blood pressure less than 140/90

## 2020-08-24 NOTE — PLAN OF CARE
Patient is alert, Mandarin speaking, oriented to self. Maintains O2 sats > 90% on room air. Good appetite for meals today. 1:1 feed assist, set up & order. Tele - NSR, ST with movement. Incontinent of bowel/bladder, kept clean & dry.  Max assist, turned & r Goal  Description  Patient's Short Term Goal:   8/20: Sleep well  8/20 AM: better PO intake  8/20 NOC: Sleep well and remain safe from falls  8/23pm: sleep  8/24 AM: Per family, Facetime with family.      Interventions:   - Set up Ipad & assist patient with communication ability and preferred communication style  - Implement communication aides and strategies  - Use visual cues when possible  - Listen attentively, be patient, do not interrupt  - Minimize distractions  - Allow time for understanding and respon

## 2020-08-24 NOTE — PLAN OF CARE
Vital signs stable. Pt denies pain and shows no signs. Pt is on room air And sp02>92% and no cough noted. Pt is reported to be wheelchair bound but is able to roll side to side in bed.  Pt is getting better with communication and seems to answer yes no ques and/or relaxation techniques  - Monitor for opioid side effects  - Notify MD/LIP if interventions unsuccessful or patient reports new pain  - Anticipate increased pain with activity and pre-medicate as appropriate  Outcome: Progressing     Problem: SAFETY deep breathe, Incentive Spirometry  - Assess the need for suctioning and perform as needed  - Assess and instruct to report SOB or any respiratory difficulty  - Respiratory Therapy support as indicated  - Manage/alleviate anxiety  - Monitor for signs/sympt

## 2020-08-24 NOTE — PROGRESS NOTES
[facility]                INFECTIOUS DISEASE PROGRESS NOTE    Tim Mcclain Patient Status:  Observation    10/27/1934 MRN [id number]   [facility] 5NW-A Attending Rosalita Mohs, MD   Hosp Day # 0 PCP MD Dennis Miles --  59   AST 29 27  --   --   --   --  44*   ALT 24 23  --   --   --   --  33   BILT 0.3 0.4  --   --   --   --  0.6   TP 7.0 6.6  --   --   --   --  6.6    < > = values in this interval not displayed.        No results found for: [facility]  Microbiology    [facility]

## 2020-08-25 LAB
AMMONIA PLAS-MCNC: 14 UMOL/L (ref 11–32)
ANION GAP SERPL CALC-SCNC: 10 MMOL/L (ref 0–18)
ANION GAP SERPL CALC-SCNC: 7 MMOL/L (ref 0–18)
BILIRUB UR QL STRIP.AUTO: NEGATIVE
BUN BLD-MCNC: 15 MG/DL (ref 7–18)
BUN BLD-MCNC: 15 MG/DL (ref 7–18)
BUN/CREAT SERPL: 17.4 (ref 10–20)
BUN/CREAT SERPL: 18.3 (ref 10–20)
CALCIUM BLD-MCNC: 7.9 MG/DL (ref 8.5–10.1)
CALCIUM BLD-MCNC: 8.2 MG/DL (ref 8.5–10.1)
CHLORIDE SERPL-SCNC: 92 MMOL/L (ref 98–112)
CHLORIDE SERPL-SCNC: 93 MMOL/L (ref 98–112)
CO2 SERPL-SCNC: 19 MMOL/L (ref 21–32)
CO2 SERPL-SCNC: 20 MMOL/L (ref 21–32)
COLOR UR AUTO: YELLOW
CREAT BLD-MCNC: 0.82 MG/DL (ref 0.55–1.02)
CREAT BLD-MCNC: 0.86 MG/DL (ref 0.55–1.02)
GLUCOSE BLD-MCNC: 111 MG/DL (ref 70–99)
GLUCOSE BLD-MCNC: 129 MG/DL (ref 70–99)
GLUCOSE UR STRIP.AUTO-MCNC: NEGATIVE MG/DL
HAV IGM SER QL: 1.9 MG/DL (ref 1.6–2.6)
HAV IGM SER QL: 2 MG/DL (ref 1.6–2.6)
KETONES UR STRIP.AUTO-MCNC: NEGATIVE MG/DL
NITRITE UR QL STRIP.AUTO: NEGATIVE
OSMOLALITY SERPL CALC.SUM OF ELEC: 251 MOSM/KG (ref 275–295)
OSMOLALITY SERPL CALC.SUM OF ELEC: 256 MOSM/KG (ref 275–295)
PH UR STRIP.AUTO: 6 [PH] (ref 4.5–8)
POTASSIUM SERPL-SCNC: 3.5 MMOL/L (ref 3.5–5.1)
POTASSIUM SERPL-SCNC: 3.9 MMOL/L (ref 3.5–5.1)
PROT UR STRIP.AUTO-MCNC: NEGATIVE MG/DL
SODIUM SERPL-SCNC: 119 MMOL/L (ref 136–145)
SODIUM SERPL-SCNC: 122 MMOL/L (ref 136–145)
SP GR UR STRIP.AUTO: 1.01 (ref 1–1.03)
UROBILINOGEN UR STRIP.AUTO-MCNC: <2 MG/DL

## 2020-08-25 RX ORDER — FUROSEMIDE 10 MG/ML
20 INJECTION INTRAMUSCULAR; INTRAVENOUS ONCE
Status: COMPLETED | OUTPATIENT
Start: 2020-08-25 | End: 2020-08-25

## 2020-08-25 RX ORDER — ESCITALOPRAM OXALATE 5 MG/1
5 TABLET ORAL DAILY
Status: DISCONTINUED | OUTPATIENT
Start: 2020-08-26 | End: 2020-09-04

## 2020-08-25 RX ORDER — SODIUM CHLORIDE 9 MG/ML
INJECTION, SOLUTION INTRAVENOUS CONTINUOUS
Status: DISCONTINUED | OUTPATIENT
Start: 2020-08-25 | End: 2020-08-26

## 2020-08-25 NOTE — PLAN OF CARE
Problem: SAFETY ADULT - FALL  Goal: Free from fall injury  Description  INTERVENTIONS:  - Assess pt frequently for physical needs  - Identify cognitive and physical deficits and behaviors that affect risk of falls.   - Derwent fall precautions as indica for signs/symptoms of CO2 retention  Outcome: Progressing       See flowsheets. Assumed care of pt at 2300. Received pt alert, NEO orientation, cooperative, pleasant, hx dementia, Primary language Mandarin. VSS, NAD. Pt denies pain at this time, wctm.  Pt m

## 2020-08-25 NOTE — PROGRESS NOTES
[facility]                INFECTIOUS DISEASE PROGRESS NOTE    Tim Mcclain Patient Status:  Observation    10/27/1934 MRN [id number]   [facility] 5NW-A Attending Drew Li MD   Hosp Day # 0 PCP MD Muriel Burnett --   --   --  44*  --    ALT 24 23  --   --   --  33  --    BILT 0.3 0.4  --   --   --  0.6  --    TP 7.0 6.6  --   --   --  6.6  --     < > = values in this interval not displayed.        No results found for: [facility] Encounter on

## 2020-08-25 NOTE — PLAN OF CARE
A/O x1, mandrin speaking. Vital signs stable, low grade temp. Tolerating RA with saturations of >92%, will wean as able. Tele-NSR. Purewick in place draining clear, yellow urine. Incontinent of bowel. No complaints or signs of pain.  Up with lift, q 2 turns for physical needs  - Identify cognitive and physical deficits and behaviors that affect risk of falls.   - Sylva fall precautions as indicated by assessment.  - Educate pt/family on patient safety including physical limitations  - Instruct pt to call f

## 2020-08-25 NOTE — PLAN OF CARE
Problem: Diabetes/Glucose Control  Goal: Glucose maintained within prescribed range  Description  INTERVENTIONS:  - Monitor Blood Glucose as ordered  - Assess for signs and symptoms of hyperglycemia and hypoglycemia  - Administer ordered medications to m appropriate  Outcome: Progressing     NEO to orientation. Patient drowsy/ sleeping on and off today. Sodium 119. IV NS started. O2 saturation >91% on room air. . Tele- NSR/ ST. Episodes of tachycardia- see orders. Poor appetite. Incontinent.  Bladder sc

## 2020-08-25 NOTE — PROGRESS NOTES
[facility]  Progress Note    667 [facility] Patient Status:  Observation    10/27/1934 MRN [id number]   [facility] 5NW-A Attending Kathryn Castillo MD   Hosp Day # 0 PCP Ford Whittington MD       SUBJECTIVE:  Lethargic, low grade temp     OBJE Subcutaneous, 2 times per day        Exam:  Gen:Lethargic, no focal neurologic deficits  Pulm: Lungs clear, normal respiratory effort  CV: Heart with regular rate and rhythm, no peripheral edema  Abd: Abdomen soft, nontender, nondistended, no organomegaly, disease) stage 3, GFR 30-59 ml/min ([facility])     History of CVA (cerebrovascular accident)     Paranoia ([facility] [address]     Essential hypertension with goal blood pressure less than 140/90     Anxiety and depression     Multiple falls     Status post left hip replacemen

## 2020-08-26 ENCOUNTER — APPOINTMENT (OUTPATIENT)
Dept: GENERAL RADIOLOGY | Facility: HOSPITAL | Age: 85
DRG: 178 | End: 2020-08-26
Attending: INTERNAL MEDICINE
Payer: MEDICARE

## 2020-08-26 ENCOUNTER — APPOINTMENT (OUTPATIENT)
Dept: CV DIAGNOSTICS | Facility: HOSPITAL | Age: 85
DRG: 178 | End: 2020-08-26
Attending: INTERNAL MEDICINE
Payer: MEDICARE

## 2020-08-26 PROBLEM — Z51.5 PALLIATIVE CARE ENCOUNTER: Status: ACTIVE | Noted: 2020-08-26

## 2020-08-26 PROBLEM — Z71.89 GOALS OF CARE, COUNSELING/DISCUSSION: Status: ACTIVE | Noted: 2020-08-26

## 2020-08-26 LAB
ALBUMIN SERPL-MCNC: 2.6 G/DL (ref 3.4–5)
ALBUMIN/GLOB SERPL: 0.6 {RATIO} (ref 1–2)
ALP LIVER SERPL-CCNC: 64 U/L (ref 55–142)
ALT SERPL-CCNC: 38 U/L (ref 13–56)
ANION GAP SERPL CALC-SCNC: 12 MMOL/L (ref 0–18)
ANION GAP SERPL CALC-SCNC: 12 MMOL/L (ref 0–18)
AST SERPL-CCNC: 58 U/L (ref 15–37)
BASOPHILS # BLD AUTO: 0.03 X10(3) UL (ref 0–0.2)
BASOPHILS NFR BLD AUTO: 0.3 %
BILIRUB SERPL-MCNC: 0.6 MG/DL (ref 0.1–2)
BUN BLD-MCNC: 15 MG/DL (ref 7–18)
BUN BLD-MCNC: 15 MG/DL (ref 7–18)
BUN/CREAT SERPL: 18.8 (ref 10–20)
BUN/CREAT SERPL: 18.8 (ref 10–20)
CALCIUM BLD-MCNC: 7.5 MG/DL (ref 8.5–10.1)
CALCIUM BLD-MCNC: 7.5 MG/DL (ref 8.5–10.1)
CHLORIDE SERPL-SCNC: 99 MMOL/L (ref 98–112)
CHLORIDE SERPL-SCNC: 99 MMOL/L (ref 98–112)
CK SERPL-CCNC: 1072 U/L (ref 26–192)
CO2 SERPL-SCNC: 17 MMOL/L (ref 21–32)
CO2 SERPL-SCNC: 17 MMOL/L (ref 21–32)
CREAT BLD-MCNC: 0.8 MG/DL (ref 0.55–1.02)
CREAT BLD-MCNC: 0.8 MG/DL (ref 0.55–1.02)
CRP SERPL-MCNC: 11.2 MG/DL (ref ?–0.3)
DEPRECATED HBV CORE AB SER IA-ACNC: 679.4 NG/ML (ref 18–340)
DEPRECATED RDW RBC AUTO: 37.3 FL (ref 35.1–46.3)
EOSINOPHIL # BLD AUTO: 0 X10(3) UL (ref 0–0.7)
EOSINOPHIL NFR BLD AUTO: 0 %
ERYTHROCYTE [DISTWIDTH] IN BLOOD BY AUTOMATED COUNT: 12.3 % (ref 11–15)
GLOBULIN PLAS-MCNC: 4.1 G/DL (ref 2.8–4.4)
GLUCOSE BLD-MCNC: 93 MG/DL (ref 70–99)
GLUCOSE BLD-MCNC: 93 MG/DL (ref 70–99)
HCT VFR BLD AUTO: 40 % (ref 35–48)
HGB BLD-MCNC: 14 G/DL (ref 12–16)
IMM GRANULOCYTES # BLD AUTO: 0.05 X10(3) UL (ref 0–1)
IMM GRANULOCYTES NFR BLD: 0.5 %
LDH SERPL L TO P-CCNC: 360 U/L
LYMPHOCYTES # BLD AUTO: 0.57 X10(3) UL (ref 1–4)
LYMPHOCYTES NFR BLD AUTO: 5.7 %
M PROTEIN MFR SERPL ELPH: 6.7 G/DL (ref 6.4–8.2)
MCH RBC QN AUTO: 30 PG (ref 26–34)
MCHC RBC AUTO-ENTMCNC: 35 G/DL (ref 31–37)
MCV RBC AUTO: 85.7 FL (ref 80–100)
MONOCYTES # BLD AUTO: 0.7 X10(3) UL (ref 0.1–1)
MONOCYTES NFR BLD AUTO: 7 %
NEUTROPHILS # BLD AUTO: 8.59 X10 (3) UL (ref 1.5–7.7)
NEUTROPHILS # BLD AUTO: 8.59 X10(3) UL (ref 1.5–7.7)
NEUTROPHILS NFR BLD AUTO: 86.5 %
OSMOLALITY SERPL CALC.SUM OF ELEC: 267 MOSM/KG (ref 275–295)
OSMOLALITY SERPL CALC.SUM OF ELEC: 267 MOSM/KG (ref 275–295)
PLATELET # BLD AUTO: 251 10(3)UL (ref 150–450)
POTASSIUM SERPL-SCNC: 3.8 MMOL/L (ref 3.5–5.1)
POTASSIUM SERPL-SCNC: 3.8 MMOL/L (ref 3.5–5.1)
RBC # BLD AUTO: 4.67 X10(6)UL (ref 3.8–5.3)
SODIUM SERPL-SCNC: 128 MMOL/L (ref 136–145)
SODIUM SERPL-SCNC: 128 MMOL/L (ref 136–145)
TROPONIN I SERPL-MCNC: <0.045 NG/ML (ref ?–0.04)
WBC # BLD AUTO: 9.9 X10(3) UL (ref 4–11)

## 2020-08-26 PROCEDURE — 71045 X-RAY EXAM CHEST 1 VIEW: CPT | Performed by: INTERNAL MEDICINE

## 2020-08-26 PROCEDURE — 99222 1ST HOSP IP/OBS MODERATE 55: CPT | Performed by: CLINICAL NURSE SPECIALIST

## 2020-08-26 PROCEDURE — 93306 TTE W/DOPPLER COMPLETE: CPT | Performed by: INTERNAL MEDICINE

## 2020-08-26 RX ORDER — POTASSIUM CHLORIDE 20 MEQ/1
40 TABLET, EXTENDED RELEASE ORAL ONCE
Status: COMPLETED | OUTPATIENT
Start: 2020-08-26 | End: 2020-08-26

## 2020-08-26 NOTE — DIETARY NOTE
[facility]    NUTRITION ASSESSMENT    Pt does not meet malnutrition criteria.     NUTRITION DIAGNOSIS/PROBLEM:    Inadequate oral intake related to physiological causes as evidenced by estimated intake less than estimated needs    NUTRITION INTERVENTIO Accumulation: none per RN documentation per visual exam.    NUTRITION PRESCRIPTION:  Calories: 9270-0013 calories/day (25-30 calories per kg)  Protein: 62-78 grams protein/day (1.2-1.5 grams protein per kg)  Fluid: ~1 ml/kcal or per MD discretion    [facility]

## 2020-08-26 NOTE — CONSULTS
[address]  [id number]  Hospital Day #0  Date of Consult: 08/26/20  Patient seen at: [facility]     This patient is COVID-19+.  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   Switch planned to Entecavir due to high creatinine     Past Surgical History:   Procedure Laterality Date   • COLONOSCOPY  01/30 Daily  acetaminophen (TYLENOL) tab 650 mg, 650 mg, Oral, Q6H PRN  ALPRAZolam (XANAX) tab 0.25 mg, 0.25 mg, Oral, Nightly PRN  amLODIPine Besylate (NORVASC) tab 5 mg, 5 mg, Oral, BID  Calcium Carbonate Antacid (TUMS) chewable tab 500 mg, 500 mg, Oral, Q6H P Melisa Rushing MD on 8/26/2020 at 6:13 AM     Finalized by (CST): Vanessa Parr MD on 8/26/2020 at 6:14 AM           Objective/Physical Exam:     Vital Signs: /86 (BP Location: Right arm)   Pulse 98   Temp 98.3 °F (36.8 °C) (Oral)   Resp 20   Ht 4' 7\" (1. but he has been increasingly concerned about her increased confusion and lack of ability to even talk on the phone with him and his sister over the last month. We discussed the concern regarding trajectory of disease process and current wishes.  We disc emergent IV medications to assist in restoring circulation and breathing.   DNAR (Do Not Attempt Resuscitation) indicates if the the patient is found with no heart beat and no spontaneous breaths, end of life is present and a \"natural death\" will be allow if found. 4. Disposition:  Return to [facility]. SW order placed and added to DC instructions. A total of 50 minutes were spent on this consult; >50% was spent counseling and coordinating care.   Thank you for allow

## 2020-08-26 NOTE — PROGRESS NOTES
MD notified about 3rd consecutive bladder scan being >400mL, after 2 straight caths. Rodriguez inserted per MD orders. MD also aware of pt's HR rising to the 180s, non-sustaining, then quickly decreasing back down to the 90s. No new orders at this time.

## 2020-08-26 NOTE — PROGRESS NOTES
[facility]  Progress Note    [address] [facility] Patient Status:  Observation    10/27/1934 MRN [id number]   [facility] 5NW-A Attending Parley Buerger, MD   Hosp Day # 0 PCP Chan Moore MD       SUBJECTIVE:  More awake today   Reported episod IV bolus 500 mL, 500 mL, Intravenous, PRN  Heparin Sodium (Porcine) 5000 UNIT/ML injection 5,000 Units, 5,000 Units, Subcutaneous, 2 times per day        Exam:  Gen:Awake , no focal neurologic deficits  Pulm: Lungs clear, normal respiratory effort  CV: Hea Problem List:     Chronic hepatitis B ([facility]     Vitamin D deficiency     SNHL (sensorineural hearing loss)     Seasonal allergies     Non-compliance     CKD (chronic kidney disease) stage 3, GFR 30-59 ml/min ([facility])     History of CVA (cerebrovascular accide

## 2020-08-26 NOTE — PROGRESS NOTES
Pt is a 79 y/o female admitted with AMS,fever and COVID 19 infection. alert but confused. on 2L02 via NC.02 sats>92%. no fveer,pain,sob.n/v or diarrhea noted. all due meds were given with applesauce. no s/s of aspiration noted. aspiration and fall precaution. Coy Pillion

## 2020-08-26 NOTE — PLAN OF CARE
A/O x1, mandarin speaking. Vital signs stable. Tolerating 2L with saturations of >92%, will wean as able. Tele-NSR/ST. Purewick in place. Bladder scan at 2000 showing 530mL. Pt straight cathed per protocol. 400mL out. No complaints or signs of pain.  Up wit status  - Assess for changes in mentation and behavior  - Position to facilitate oxygenation and minimize respiratory effort  - Oxygen supplementation based on oxygen saturation or ABGs  - Provide Smoking Cessation handout, if applicable  - Encourage bron

## 2020-08-26 NOTE — PROGRESS NOTES
[facility]                INFECTIOUS DISEASE PROGRESS NOTE    Tim Mcclain Patient Status:  Observation    10/27/1934 MRN [id number]   [facility] 5NW-A Attending Kathryn Castillo MD   Hosp Day # 0 PCP MD Jeyson Manzano 59  --   --  64   AST 27  --  44*  --   --  58*   ALT 23  --  33  --   --  38   BILT 0.4  --  0.6  --   --  0.6   TP 6.6  --  6.6  --   --  6.7    < > = values in this interval not displayed.        No results found for: [facility]

## 2020-08-27 LAB
ALBUMIN SERPL-MCNC: 2.3 G/DL (ref 3.4–5)
ALBUMIN/GLOB SERPL: 0.6 {RATIO} (ref 1–2)
ALP LIVER SERPL-CCNC: 63 U/L (ref 55–142)
ALT SERPL-CCNC: 32 U/L (ref 13–56)
ANION GAP SERPL CALC-SCNC: 9 MMOL/L (ref 0–18)
ANION GAP SERPL CALC-SCNC: 9 MMOL/L (ref 0–18)
AST SERPL-CCNC: 52 U/L (ref 15–37)
ATRIAL RATE: 78 BPM
BASOPHILS # BLD AUTO: 0.01 X10(3) UL (ref 0–0.2)
BASOPHILS NFR BLD AUTO: 0.1 %
BILIRUB SERPL-MCNC: 0.6 MG/DL (ref 0.1–2)
BUN BLD-MCNC: 17 MG/DL (ref 7–18)
BUN BLD-MCNC: 17 MG/DL (ref 7–18)
BUN/CREAT SERPL: 20.7 (ref 10–20)
BUN/CREAT SERPL: 20.7 (ref 10–20)
CALCIUM BLD-MCNC: 8.1 MG/DL (ref 8.5–10.1)
CALCIUM BLD-MCNC: 8.1 MG/DL (ref 8.5–10.1)
CHLORIDE SERPL-SCNC: 102 MMOL/L (ref 98–112)
CHLORIDE SERPL-SCNC: 102 MMOL/L (ref 98–112)
CO2 SERPL-SCNC: 19 MMOL/L (ref 21–32)
CO2 SERPL-SCNC: 19 MMOL/L (ref 21–32)
CREAT BLD-MCNC: 0.82 MG/DL (ref 0.55–1.02)
CREAT BLD-MCNC: 0.82 MG/DL (ref 0.55–1.02)
DEPRECATED RDW RBC AUTO: 38.3 FL (ref 35.1–46.3)
EOSINOPHIL # BLD AUTO: 0 X10(3) UL (ref 0–0.7)
EOSINOPHIL NFR BLD AUTO: 0 %
ERYTHROCYTE [DISTWIDTH] IN BLOOD BY AUTOMATED COUNT: 12.3 % (ref 11–15)
GLOBULIN PLAS-MCNC: 4.1 G/DL (ref 2.8–4.4)
GLUCOSE BLD-MCNC: 100 MG/DL (ref 70–99)
GLUCOSE BLD-MCNC: 100 MG/DL (ref 70–99)
HCT VFR BLD AUTO: 39.5 % (ref 35–48)
HGB BLD-MCNC: 13.4 G/DL (ref 12–16)
IMM GRANULOCYTES # BLD AUTO: 0.05 X10(3) UL (ref 0–1)
IMM GRANULOCYTES NFR BLD: 0.6 %
LYMPHOCYTES # BLD AUTO: 0.54 X10(3) UL (ref 1–4)
LYMPHOCYTES NFR BLD AUTO: 6.6 %
M PROTEIN MFR SERPL ELPH: 6.4 G/DL (ref 6.4–8.2)
MCH RBC QN AUTO: 29.3 PG (ref 26–34)
MCHC RBC AUTO-ENTMCNC: 33.9 G/DL (ref 31–37)
MCV RBC AUTO: 86.2 FL (ref 80–100)
MONOCYTES # BLD AUTO: 0.6 X10(3) UL (ref 0.1–1)
MONOCYTES NFR BLD AUTO: 7.4 %
NEUTROPHILS # BLD AUTO: 6.93 X10 (3) UL (ref 1.5–7.7)
NEUTROPHILS # BLD AUTO: 6.93 X10(3) UL (ref 1.5–7.7)
NEUTROPHILS NFR BLD AUTO: 85.3 %
OSMOLALITY SERPL CALC.SUM OF ELEC: 272 MOSM/KG (ref 275–295)
OSMOLALITY SERPL CALC.SUM OF ELEC: 272 MOSM/KG (ref 275–295)
P AXIS: 47 DEGREES
P-R INTERVAL: 152 MS
PLATELET # BLD AUTO: 280 10(3)UL (ref 150–450)
POTASSIUM SERPL-SCNC: 3.1 MMOL/L (ref 3.5–5.1)
POTASSIUM SERPL-SCNC: 3.1 MMOL/L (ref 3.5–5.1)
POTASSIUM SERPL-SCNC: 4 MMOL/L (ref 3.5–5.1)
Q-T INTERVAL: 410 MS
QRS DURATION: 62 MS
QTC CALCULATION (BEZET): 504 MS
R AXIS: -12 DEGREES
RBC # BLD AUTO: 4.58 X10(6)UL (ref 3.8–5.3)
SODIUM SERPL-SCNC: 130 MMOL/L (ref 136–145)
SODIUM SERPL-SCNC: 130 MMOL/L (ref 136–145)
T AXIS: 93 DEGREES
VENTRICULAR RATE: 91 BPM
WBC # BLD AUTO: 8.1 X10(3) UL (ref 4–11)

## 2020-08-27 PROCEDURE — 99233 SBSQ HOSP IP/OBS HIGH 50: CPT | Performed by: NURSE PRACTITIONER

## 2020-08-27 PROCEDURE — 99223 1ST HOSP IP/OBS HIGH 75: CPT | Performed by: OTHER

## 2020-08-27 RX ORDER — DEXAMETHASONE SODIUM PHOSPHATE 4 MG/ML
4 VIAL (ML) INJECTION EVERY 24 HOURS
Status: DISCONTINUED | OUTPATIENT
Start: 2020-08-27 | End: 2020-09-01 | Stop reason: HOSPADM

## 2020-08-27 RX ORDER — POTASSIUM CHLORIDE 14.9 MG/ML
20 INJECTION INTRAVENOUS ONCE
Status: COMPLETED | OUTPATIENT
Start: 2020-08-27 | End: 2020-08-27

## 2020-08-27 NOTE — PROGRESS NOTES
[address] [facility] 149 Follow Up    Tim Mcclain Patient Status:  Inpatient    10/27/1934 MRN [id number]   [facility] 5NW-A Attending Sheila Whitman MD   [facility] Day # 1 PCP Eleazar King MD     Date of visit:  2020  Day 1 of hos 08/27/2020       Coags:   Lab Results   Component Value Date    INR 0.93 04/12/2019    PTT 33.5 04/12/2019       Chemistry:  Lab Results   Component Value Date    CREATSERUM 0.82 08/27/2020    CREATSERUM 0.82 08/27/2020    BUN 17 08/27/2020    BUN 17 08/27 Care Minimal Drowsy/confused   10 Bedbound/coma Extensive Disease  Can't do any work  coma  Max Assist  Total Care Mouth care Drowsy or coma   0 Death       Palliative Care Assessment:     Goals of Care Discussion: I was in contact with son/POA Jose D Woodruff document not having signature, he is considered surrogate. We also reviewed who else contributes to information collecting and decision-making in his family and he told me his sister Huong Harry is involved as he is, and she can receive information.  He is has accepted support of palliative care with [facility] on DC pending course. Code Status: Full Code,   POLST:  Deferred. HCPOA:  Carly Brandt (son) [phone]. He makes decisions collectively with his sister, Pradip Chilel.     Psychosocial and / or S

## 2020-08-27 NOTE — CM/SW NOTE
Andrea called Jannie from [facility] re: order for community palliative care. She will arrange.     Roseanne Patiño LCSW  /Discharge Planner  [phone]

## 2020-08-27 NOTE — PROGRESS NOTES
Dr Porter Friday is here bp is 89/54, norvasc held and dcd, we will monitor bp , renetta Gonzalez said to put Dr Nilam Hayden on consult

## 2020-08-27 NOTE — PAYOR COMM NOTE
--------------  PLEASE FAX INPT DAYS AUTHORIZED ALONG WITH NRD [phone]    [facility] YOU    ADMISSION REVIEW     Payor: [id number] #:  [id number]  Authorization Number: [id number]    Admit date: 8/26/20  Admit time: [address] Tenofovir since 2010.   Switch planned to Entecavir due to high creatinine     Past Surgical History:   Procedure Laterality Date    Repeat PRN   • HIP HEMIARTHROPLASTY/ BIPOLAR Right 8/5/2018    Performed by Sher Bautista MD at [place] [address] [address] (*)     Albumin 3.0 (*)     A/G Ratio 0.8 (*)     All other components within normal limits   RAPID SARS-COV-2 BY PCR - Abnormal; Notable for the following components:    Rapid SARS-CoV-2 by PCR Detected (*)     All other components wit needed. , Disp: , Rfl:   PEG 3350 17 g Oral Powd Pack, Take 17 g by mouth daily as needed. , Disp: , Rfl:   guaiFENesin 100 MG/5ML Oral Solution, Take 100 mg by mouth every 6 (six) hours as needed. , Disp: , Rfl:   Melatonin 1 MG Oral Cap, Take 7.5 mg by mout lesions. No erythema. Psychiatric: Appropriate mood and affect.     Laboratory Data:   Lab Results   Component Value Date    WBC 5.5 08/19/2020    HGB 13.7 08/19/2020    HCT 39.7 08/19/2020    .0 08/19/2020    CREATSERUM 0.92 08/20/2020    BUN 17 08 (37.4 °C)  Pulse:  [72-81] 79  Resp:  [12-18] 18  BP: (115-137)/(62-71) 125/62    ASSESSMENT/PLAN:  1.  COVID-19 infection  Long term resident of [facility] in [facility]  ---sent to ED for lethargy, low grade temp- SARS-CoV-2 PCR RNA detected by PCR RA     Objective:  Temp:  [95.5 °F (35.3 °C)-99.3 °F (37.4 °C)] 95.5 °F (35.3 °C)  Pulse:  [84-89] 89  Resp:  [16-22] 16  BP: (123)/(55-59) 123/55    Lab 08/21/20  0752 08/22/20  0744   CRP 4.06* 5.85*   ANNELISE 264.2 347.1*    270*   DDIMER 0.60 0.60 PT/OT  Discharge planning if OK by ID    ID:    Subjective:  Saturating 91-96% on room air  Tm 100.0    Objective:  Temp:  [97 °F (36.1 °C)-100 °F (37.8 °C)] 98.5 °F (36.9 °C)  Pulse:  [72-94] 94  Resp:  [16-20] 18    Lab 08/21/20  0752 08/22/20  0744 08 46* 66 83  --   --  77 75   GFRNAA 40* 57* 72  --   --  66 65   CA 8.4* 7.9* 7.2*  --   --  8.0* 8.2*   ALB 3.0* 3.0*  --   --   --  2.7*  --    * 134* 134*  --   --  129* 119*   K 3.1* 2.9* 2.8*   < > 4.8 4.2 3.9    102 104  --   --  101 93* 392.1* 449.9* 679.4*    270* 308* 281* 360*   DDIMER 0.60 0.60 0.57  --   --      Lab 08/20/20  1992   08/24/20  0736 08/25/20  0912 08/25/20  1550 08/26/20  0649   *   < > 126* 129* 111* 93  93   BUN 17   < > 14 15 15 15  15   CREATSERUM 0.9 Wt 114 lb 8 oz (51.9 kg)   SpO2 93%   BMI 26.61 kg/m²   GENERAL:  Patient is a 80year old female in no acute distress. NEUROLOGICAL:  This patient is alert and looks when examiner enters room.   Through assist of , patient would not attempt to a 0.6 0.6   TP 6.6  --   --  6.7 6.4     ASSESSMENT/PLAN:  1.  COVID-19 infection  Long term resident of [facility] in [place]  ---sent to ED for lethargy, low grade temp- SARS-CoV-2 PCR RNA detected by PCR 8/19/2020  Now on 02, decadron added     2 4545 Given 1 Application Topical Arin Mckeon RN      potassium chloride 40 mEq in sodium chloride 0.9% 250 mL IVPB     Date Action Dose Route User    8/27/2020 1148 New Bag 40 mEq Intravenous Destinee Dowd RN      [facility]) tab 8.6 mg     D

## 2020-08-27 NOTE — PROGRESS NOTES
[address] [facility] Neurology Initial Evaluation    Haley Mcclain Patient Status:  Inpatient    10/27/1934 MRN [id number]   [facility] 5NW-A Attending Jahaira Tolliver MD   Hosp Day # 1 PCP Nura Blackburn MD     REASON FOR CONSULTATION:  Ludwin Kahn Laterality Date   • COLONOSCOPY  01/30/2018= Hemorrhoids, :Polyp (HP0, Bx normal    Repeat PRN   • HIP HEMIARTHROPLASTY/ BIPOLAR Right 8/5/2018    Performed by Kathy Khan MD at [facility] MAIN OR   • HIP REPLACEMENT SURGERY Left 08/05/2018    left cemented bi 90 tablet, Rfl: 3  Entecavir 0.5 MG Oral Tab, Take 1 tablet (0.5 mg total) by mouth once daily. , Disp: 30 tablet, Rfl: 12, Taking  Cholecalciferol (VITAMIN D) 1000 UNITS Oral Tab, Take  by mouth., Disp: , Rfl: , Taking      potassium chloride 40 mEq in sod lb 8 oz (51.9 kg)   SpO2 93%   BMI 26.61 kg/m²   GENERAL:  Patient is a 80year old female in no acute distress. NEUROLOGICAL:  This patient is alert and looks when examiner enters room.   Through assist of , patient would not attempt to answer q Lethargy and worsening dementia likely related to COVID infection. Will discuss with Dr. Janet Sprague.       LAMIN Landers  [facility] [id number]  Pager [phone]  8/27/2020, 11:25 AM

## 2020-08-27 NOTE — PLAN OF CARE
A/O x1, mandarin speaking. Vital signs stable. Tolerating 3L with saturations of >92%, will wean as able. Tele-NSR/ST. Rodriguez in place draining clear, yellow urine. No complaints or signs of pain. Up with lift. Q 2 turn. IV abx. Safety precautions in place.

## 2020-08-27 NOTE — CONSULTS
Neurology H&P    [address] Patient Status:  Inpatient    10/27/1934 MRN [id number]   [facility] 5NW-A Attending Ulysses Sager, MD   Hosp Day # 1 PCP Chanel Chawla MD     Subjective: (performed by APN)  [address] is a(n) 80year old fema DDD (degenerative disc disease), cervical 1/7/2019 MRI cervical spine    done at [facility]; multiple levels of mild canal stenosis   • DDD (degenerative disc disease), lumbar 1/7/19 MRI done at [facility]   • Dementia [facility])    • Depression    • Hearing loss     left he %.    Exam limited by COVID and dementia and lethargy      Neurologic:   GENERAL:  Patient is a 80year old female in no acute distress. NEUROLOGICAL:  This patient is alert and looks when examiner enters room.   Through assist of , patient would advanced amount of cerebral atrophy, chronic appearing infarct in the L thalamus and moderate to marked microvascular disease.  I did call her POA (son) who says that she moved into a nursing home about 18 months ago and has had worsening confusion since [place]

## 2020-08-27 NOTE — PROGRESS NOTES
[facility]                INFECTIOUS DISEASE PROGRESS NOTE    Tim Mcclain Patient Status:  Observation    10/27/1934 MRN [id number]   [facility] 5NW-A Attending Andrew Andrade MD   Hosp Day # 1 PCP Mei Davies MD     Decadron #1 < > 62 67  67 65  65   CA 8.0*   < > 7.9* 7.5*  7.5* 8.1*  8.1*   ALB 2.7*  --   --  2.6* 2.3*   *   < > 122* 128*  128* 130*  130*   K 4.2   < > 3.5 3.8  3.8 3.1*  3.1*      < > 92* 99  99 102  102   CO2 21.0   < > 20.0* 17.0*  17.0* 19.0*  1 vancomycin    MD [facility]ina [facility]  [phone]

## 2020-08-28 LAB
ANION GAP SERPL CALC-SCNC: 10 MMOL/L (ref 0–18)
BUN BLD-MCNC: 24 MG/DL (ref 7–18)
BUN/CREAT SERPL: 31.6 (ref 10–20)
CALCIUM BLD-MCNC: 7.8 MG/DL (ref 8.5–10.1)
CHLORIDE SERPL-SCNC: 106 MMOL/L (ref 98–112)
CO2 SERPL-SCNC: 18 MMOL/L (ref 21–32)
CREAT BLD-MCNC: 0.76 MG/DL (ref 0.55–1.02)
GLUCOSE BLD-MCNC: 128 MG/DL (ref 70–99)
OSMOLALITY SERPL CALC.SUM OF ELEC: 284 MOSM/KG (ref 275–295)
POTASSIUM SERPL-SCNC: 3.7 MMOL/L (ref 3.5–5.1)
SODIUM SERPL-SCNC: 134 MMOL/L (ref 136–145)

## 2020-08-28 RX ORDER — POTASSIUM CHLORIDE 20 MEQ/1
40 TABLET, EXTENDED RELEASE ORAL ONCE
Status: COMPLETED | OUTPATIENT
Start: 2020-08-28 | End: 2020-08-28

## 2020-08-28 NOTE — PROGRESS NOTES
[facility]                INFECTIOUS DISEASE PROGRESS NOTE    Tim Mcclain Patient Status:  Observation    10/27/1934 MRN [id number]   [facility] 5NW-A Attending Parley Buerger, MD   Hosp Day # 2 PCP Chan Moore MD     Decadron #2 128*  128* 130*  130*  --  134*   K 4.2   < > 3.8  3.8 3.1*  3.1* 4.0 3.7      < > 99  99 102  102  --  106   CO2 21.0   < > 17.0*  17.0* 19.0*  19.0*  --  18.0*   ALKPHO 59  --  64 63  --   --    AST 44*  --  58* 52*  --   --    ALT 33  --  38 32  - today    3.  Aspiration risk, swallow study    Gema Owens MD  [facility]  [phone]

## 2020-08-28 NOTE — PROGRESS NOTES
[facility]  Progress Note    Terra Rough Patient Status:  Observation    10/27/1934 MRN [id number]   [facility] 5NW-A Attending Lexi Vargas MD   Hosp Day # 2 PCP Elizabeth Peoples MD       SUBJECTIVE:  Awake  Afebrile  BP better    OBJE Intravenous, PRN  Heparin Sodium (Porcine) 5000 UNIT/ML injection 5,000 Units, 5,000 Units, Subcutaneous, 2 times per day        Exam:  Gen:Lethargic, opens eyes   , no focal neurologic deficits  Pulm: Lungs clear, normal respiratory effort  CV: Heart with hepatitis B ([facility]     Vitamin D deficiency     SNHL (sensorineural hearing loss)     Seasonal allergies     Non-compliance     CKD (chronic kidney disease) stage 3, GFR 30-59 ml/min ([facility])     History of CVA (cerebrovascular accident)     Paranoia ([facility]

## 2020-08-28 NOTE — CM/SW NOTE
Updates sent via Aidin to [facility] STEFAN  [phone].     Nigel Willson, MEERAW

## 2020-08-28 NOTE — PLAN OF CARE
Alert to self. Primarily Mandarin speaking. 4LNC, weaning as tolerated to baseline RA. NSR/ ST on teleLatonia Rodriguez for retention. W/c bound per baseline. Denies any sign of pain or respiratory distress. Repositioned frequently.  Video monitoring in place for saf Per family, Chidi with family.    8/24 [address] [place]: Sleep well, chidi with dt in AM  8/25: to be more alert  8/25 NOC: Void on own  8/26 [address] [place]: Remain free from falls  8/2 noc: wean O2 as tolerated     Interventions:   - Set up Ipad & assist patient with faceti communication ability and preferred communication style  - Implement communication aides and strategies  - Use visual cues when possible  - Listen attentively, be patient, do not interrupt  - Minimize distractions  - Allow time for understanding and respon

## 2020-08-28 NOTE — PROGRESS NOTES
[facility]  Progress Note    [address]  Patient Status:  Observation    10/27/1934 MRN [id number]   [facility] 5NW-A Attending Cande Izquierdo MD   Hosp Day # 1 PCP Jocelyne Cooper MD       SUBJECTIVE:  Still lethargic, on 3L O2  Low BP Nightly  sodium chloride 0.9% IV bolus 500 mL, 500 mL, Intravenous, PRN  Heparin Sodium (Porcine) 5000 UNIT/ML injection 5,000 Units, 5,000 Units, Subcutaneous, 2 times per day        Exam:  Gen:Lethargic, opens eyes   , no focal neurologic deficits  Pulm: Problem List:     [facility] ([facility]     Vitamin D deficiency     SNHL (sensorineural hearing loss)     Seasonal allergies     Non-compliance     CKD (chronic kidney disease) stage 3, GFR 30-59 ml/min ([facility])     History of CVA (cerebrovascular accide

## 2020-08-28 NOTE — PROGRESS NOTES
Pt is a 79 y/o female admitted with AMS,fever and COVID 19 infection. alert but confused. on 2L02 via NC.02 sats>92%. no fveer,pain,sob.n/v or diarrhea noted. all due meds were given with applesauce. Rodriguez for retention. C/D/I.on tele-NSR/ST. repositioned q2 hrs

## 2020-08-28 NOTE — PAYOR COMM NOTE
--------------  CONTINUED STAY REVIEW    Payor: [employer]  Subscriber #:  [id number]  Authorization Number: [id number]    Admit date: 8/26/20  Admit time: 1815    FAXING CLINICAL UPDATE FOR 8/28/20 8/28/20   SUBJECTIVE:  Awake  Afebrile  BP DAY:  acetaminophen (TYLENOL) tab 650 mg     Date Action Dose Route User    8/28/2020 0624 Given 650 mg Oral Stanmelly Chavarria RN      cholecalciferol (VITAMIN D3) cap/tab 2,000 Units     Date Action Dose Route User    8/28/2020 0327 Given 2000 Units Oral Pu Action Dose Route User    8/27/2020 2155 Given 5 mg Oral Karlene Mio, GALO      muscle rub (THERA-GESIC) 84-59% cream 1 Application     Date Action Dose Route User    8/28/2020 7830 Given 1 Application Topical Alissa King RN    8/27/2020 2155 Giv

## 2020-08-28 NOTE — SLP NOTE
ADULT SWALLOWING EVALUATION    ASSESSMENT    ASSESSMENT/OVERALL IMPRESSION:  Pt seen for bedside swallow evaluation per physician order.   Per RN report, pt had been tolerating regular diet textures with thin liquids since admission without overt clinical s hair net. RECOMMENDATIONS   Diet Recommendations - Solids: Regular  Diet Recommendations - Liquid: Thin                        Compensatory Strategies Recommended: Slow rate;Small bites and sips  Aspiration Precautions: Upright position; Slow rate goal    SWALLOWING HISTORY  Current Diet Consistency: NPO(had been tolerating regular/thin prior to today per RN)  Dysphagia History: No report of swallow history  Imaging Results: CXR 8/26 No active cardiopulmonary process identified.        SUBJECTIVE

## 2020-08-29 LAB
ALBUMIN SERPL-MCNC: 2.4 G/DL (ref 3.4–5)
ALBUMIN/GLOB SERPL: 0.6 {RATIO} (ref 1–2)
ALP LIVER SERPL-CCNC: 62 U/L (ref 55–142)
ALT SERPL-CCNC: 40 U/L (ref 13–56)
ANION GAP SERPL CALC-SCNC: 7 MMOL/L (ref 0–18)
AST SERPL-CCNC: 40 U/L (ref 15–37)
BASOPHILS # BLD AUTO: 0 X10(3) UL (ref 0–0.2)
BASOPHILS NFR BLD AUTO: 0 %
BILIRUB SERPL-MCNC: 0.5 MG/DL (ref 0.1–2)
BUN BLD-MCNC: 29 MG/DL (ref 7–18)
BUN/CREAT SERPL: 32.6 (ref 10–20)
CALCIUM BLD-MCNC: 8.6 MG/DL (ref 8.5–10.1)
CHLORIDE SERPL-SCNC: 108 MMOL/L (ref 98–112)
CO2 SERPL-SCNC: 21 MMOL/L (ref 21–32)
CREAT BLD-MCNC: 0.89 MG/DL (ref 0.55–1.02)
DEPRECATED RDW RBC AUTO: 40 FL (ref 35.1–46.3)
EOSINOPHIL # BLD AUTO: 0 X10(3) UL (ref 0–0.7)
EOSINOPHIL NFR BLD AUTO: 0 %
ERYTHROCYTE [DISTWIDTH] IN BLOOD BY AUTOMATED COUNT: 12.7 % (ref 11–15)
GLOBULIN PLAS-MCNC: 4 G/DL (ref 2.8–4.4)
GLUCOSE BLD-MCNC: 114 MG/DL (ref 70–99)
HCT VFR BLD AUTO: 38.1 % (ref 35–48)
HGB BLD-MCNC: 13.1 G/DL (ref 12–16)
IMM GRANULOCYTES # BLD AUTO: 0.05 X10(3) UL (ref 0–1)
IMM GRANULOCYTES NFR BLD: 0.6 %
LYMPHOCYTES # BLD AUTO: 0.62 X10(3) UL (ref 1–4)
LYMPHOCYTES NFR BLD AUTO: 7.5 %
M PROTEIN MFR SERPL ELPH: 6.4 G/DL (ref 6.4–8.2)
MCH RBC QN AUTO: 30.1 PG (ref 26–34)
MCHC RBC AUTO-ENTMCNC: 34.4 G/DL (ref 31–37)
MCV RBC AUTO: 87.6 FL (ref 80–100)
MONOCYTES # BLD AUTO: 0.71 X10(3) UL (ref 0.1–1)
MONOCYTES NFR BLD AUTO: 8.6 %
NEUTROPHILS # BLD AUTO: 6.88 X10 (3) UL (ref 1.5–7.7)
NEUTROPHILS # BLD AUTO: 6.88 X10(3) UL (ref 1.5–7.7)
NEUTROPHILS NFR BLD AUTO: 83.3 %
OSMOLALITY SERPL CALC.SUM OF ELEC: 289 MOSM/KG (ref 275–295)
PLATELET # BLD AUTO: 349 10(3)UL (ref 150–450)
POTASSIUM SERPL-SCNC: 3.6 MMOL/L (ref 3.5–5.1)
POTASSIUM SERPL-SCNC: 4.4 MMOL/L (ref 3.5–5.1)
RBC # BLD AUTO: 4.35 X10(6)UL (ref 3.8–5.3)
SODIUM SERPL-SCNC: 136 MMOL/L (ref 136–145)
WBC # BLD AUTO: 8.3 X10(3) UL (ref 4–11)

## 2020-08-29 RX ORDER — POTASSIUM CHLORIDE 20 MEQ/1
40 TABLET, EXTENDED RELEASE ORAL EVERY 4 HOURS
Status: COMPLETED | OUTPATIENT
Start: 2020-08-29 | End: 2020-08-29

## 2020-08-29 NOTE — PROGRESS NOTES
[facility]  Progress Note    [address] [facility]  Patient Status:  Observation    10/27/1934 MRN [id number]   [facility] 5NW-A Attending Davide Jamison MD   Hosp Day # 3 PCP Wendi Marks MD       SUBJECTIVE:  Awake,   Afebrile  Had Swallow luis (Porcine) 5000 UNIT/ML injection 5,000 Units, 5,000 Units, Subcutaneous, 2 times per day        Exam:  Gen Awake,  Making noise   , no focal neurologic deficits  Pulm: Lungs clear, normal respiratory effort  CV: Heart with regular rate and rhythm, no perip Active Problem List:     Chronic hepatitis B ([facility]     Vitamin D deficiency     SNHL (sensorineural hearing loss)     Seasonal allergies     Non-compliance     CKD (chronic kidney disease) stage 3, GFR 30-59 ml/min ([facility])     History of CVA (cerebrovascular

## 2020-08-29 NOTE — PLAN OF CARE
Problem: Diabetes/Glucose Control  Goal: Glucose maintained within prescribed range  Description  INTERVENTIONS:  - Monitor Blood Glucose as ordered  - Assess for signs and symptoms of hyperglycemia and hypoglycemia  - Administer ordered medications to m side effects  - Notify MD/LIP if interventions unsuccessful or patient reports new pain  - Anticipate increased pain with activity and pre-medicate as appropriate  Outcome: Progressing     Problem: SAFETY ADULT - FALL  Goal: Free from fall injury  Descript for suctioning and perform as needed  - Assess and instruct to report SOB or any respiratory difficulty  - Respiratory Therapy support as indicated  - Manage/alleviate anxiety  - Monitor for signs/symptoms of CO2 retention  Outcome: Progressing     Problem

## 2020-08-29 NOTE — PROGRESS NOTES
[facility]                INFECTIOUS DISEASE TELEMEDICINE PROGRESS NOTE    Tim Mcclain Patient Status:  Observation    10/27/1934 MRN [id number]   [facility] 5NW-A Attending Jahaira Tolliver MD   Hosp Day # 3 PCP Nura Balckburn MD --  106   CO2 21.0   < > 17.0*  17.0* 19.0*  19.0*  --  18.0*   ALKPHO 59  --  64 63  --   --    AST 44*  --  58* 52*  --   --    ALT 33  --  38 32  --   --    BILT 0.6  --  0.6 0.6  --   --    TP 6.6  --  6.7 6.4  --   --     < > = values in this interval Consultants  [phone]

## 2020-08-29 NOTE — PLAN OF CARE
Pt is alert to self, mandarin speaking only. Pt yells out. 2L O2, RA baseline. Tele NSR/ST. Afebrile. VSS. Briefed and incontinent. Catheter in place. No signs on pain.  Pt was pocketing food this AM when eating breakfast, speech evaluated her and changed h needed  - Communicate barriers and strategies to overcome with those who interact with patient  Outcome: Progressing     Problem: GASTROINTESTINAL - ADULT  Goal: Maintains adequate nutritional intake (undernourished)  Description  INTERVENTIONS:  - Monitor

## 2020-08-29 NOTE — PROGRESS NOTES
Multidisciplinary Discharge Rounds held 8/29/2020. Treatment team members present today include , , Charge Nurse, Nurse, RT, PT and Pharmacy caring for [employer].      Other care providers present: GI, ID, palliative     Mobili

## 2020-08-29 NOTE — SLP NOTE
SPEECH DAILY NOTE - INPATIENT    ASSESSMENT & PLAN   ASSESSMENT  Pt seen for MM per RN request.  Pt more weak and refusing food per RN. Pt repositioned and trials of thin nectar and puree were given. Pt mouth very dry and overall pt weak and confused.   P

## 2020-08-30 ENCOUNTER — ANESTHESIA EVENT (OUTPATIENT)
Dept: ENDOSCOPY | Facility: HOSPITAL | Age: 85
DRG: 178 | End: 2020-08-30
Payer: MEDICARE

## 2020-08-30 NOTE — CONSULTS
Eneida Do MD    Department of Gastroenterology  [address] [facility] Patient Status:  Inpatient    10/27/1934 MRN [id number]   [facility] 5NW-A Attending Jahaira Tolliver MD   [facility] Day # 4 PCP PHACOEMULSIFICATION OF CATARACT WITH INTRAOCULAR LENS IMPLANT [id number] Left 12/16/2015    Performed by Edwin Meredith MD at [address] [place] [address]   • RIGHT PHACOEMULSIFICATION OF CATARACT WITH INTRAOCULAR LENS IMPLANT [id number] Right 1/6/2016    Performed by gain, sleep disturbance. Cardiovascular: Denies history of heart murmur, chest pain or angina. Respiratory: Denies shortness of breath, chronic/frequent hoarseness, wheezing, chronic cough, cough up sputum.   Genitourinary: Denies kidney stones, painful/d Tiki Han, who requests all measures to be taken to treat pt including PEG. Hx or chronic hepatitis B and covid19 diagnosis (8/19). Plan:  1. Hold heparin in AM.  2. EGD with PEG with MAC by Dr. Cassandra Fishman tomorrow. 3. Ancef 1 gm IVP on call to gi lab.

## 2020-08-30 NOTE — PLAN OF CARE
Problem: Diabetes/Glucose Control  Goal: Glucose maintained within prescribed range  Description  INTERVENTIONS:  - Monitor Blood Glucose as ordered  - Assess for signs and symptoms of hyperglycemia and hypoglycemia  - Administer ordered medications to m and/or relaxation techniques  - Monitor for opioid side effects  - Notify MD/LIP if interventions unsuccessful or patient reports new pain  - Anticipate increased pain with activity and pre-medicate as appropriate  Outcome: Progressing     Problem: SAFETY deep breathe, Incentive Spirometry  - Assess the need for suctioning and perform as needed  - Assess and instruct to report SOB or any respiratory difficulty  - Respiratory Therapy support as indicated  - Manage/alleviate anxiety  - Monitor for signs/sympt

## 2020-08-30 NOTE — PLAN OF CARE
Pt is alert to self, mandarin speaking only. Pt yells out. RA due to pt taking off O2, saturating well regardless. Takes off tele herself. Afebrile. VSS. Briefed and incontinent. Catheter in place. No signs on pain.  Pt is not interested in food, will only oxygenation  Description  INTERVENTIONS:  - Assess for changes in respiratory status  - Assess for changes in mentation and behavior  - Position to facilitate oxygenation and minimize respiratory effort  - Oxygen supplementation based on oxygen saturation

## 2020-08-30 NOTE — ANESTHESIA PREPROCEDURE EVALUATION
PRE-OP EVALUATION    Patient Name: Enrique Dahl    Pre-op Diagnosis: GI bleed [K92.2]    Procedure(s):  ESOPHAGOGASTRODUODENOSCOPY (EGD)  AND   PERCUTANEOUS ENDOSCOPIC GASTROSTOMY PLACEMENT/JEJUNOSTOMY TUBE PLACEMENT    Surgeon(s) and Role:     Troy Bond mEq, Intravenous, Once  lamivudine (EPIVIR) 10 MG/ML solution 100 mg, 100 mg, Oral, Daily  acetaminophen (TYLENOL) tab 650 mg, 650 mg, Oral, Q6H PRN  ALPRAZolam (XANAX) tab 0.25 mg, 0.25 mg, Oral, Nightly PRN  Calcium Carbonate Antacid (TUMS) chewable tab (two) times a day. Bilateral knees for pain, Disp: , Rfl:   AmLODIPine Besylate 5 MG Oral Tab, Take 1 tablet (5 mg total) by mouth 2 (two) times daily. , Disp: 180 tablet, Rfl: 3  escitalopram (LEXAPRO) 10 MG Oral Tab, Take 1 tablet (10 mg total) by mouth d No      Alcohol/week: 0.0 standard drinks      Drug use: Unknown     Available pre-op labs reviewed.   Lab Results   Component Value Date    WBC 8.3 08/29/2020    RBC 4.35 08/29/2020    HGB 13.1 08/29/2020    HCT 38.1 08/29/2020    MCV 87.6 08/29/2020    MC

## 2020-08-31 ENCOUNTER — ANESTHESIA (OUTPATIENT)
Dept: ENDOSCOPY | Facility: HOSPITAL | Age: 85
DRG: 178 | End: 2020-08-31
Payer: MEDICARE

## 2020-08-31 PROCEDURE — 99233 SBSQ HOSP IP/OBS HIGH 50: CPT | Performed by: CLINICAL NURSE SPECIALIST

## 2020-08-31 RX ORDER — AMLODIPINE BESYLATE 5 MG/1
5 TABLET ORAL DAILY
Status: DISCONTINUED | OUTPATIENT
Start: 2020-08-31 | End: 2020-09-04

## 2020-08-31 RX ORDER — SODIUM CHLORIDE AND POTASSIUM CHLORIDE .9; .15 G/100ML; G/100ML
SOLUTION INTRAVENOUS CONTINUOUS
Status: DISCONTINUED | OUTPATIENT
Start: 2020-08-31 | End: 2020-09-02

## 2020-08-31 NOTE — PROGRESS NOTES
[facility]                INFECTIOUS DISEASE PROGRESS NOTE    Tim Mcclain Patient Status:  Observation    10/27/1934 MRN [id number]   [facility] 5NW-A Attending Drew Li MD   Hosp Day # 5 PCP Debi Moses MD     Decadron #5 interval not displayed. No results found for: [facility] Encounter on 08/19/20   1.  BLOOD CULTURE     Status: None (Preliminary result)    Collection Time: 08/26/20  6:10 PM   Result Value Ref Range    Blood Culture Result No Kevyn

## 2020-08-31 NOTE — PROGRESS NOTES
Patient is A/Ox1. Mandarin speaking. Vitals are stable. Patient is on RA. Incontinent and briefed with valle. Valle removed at 1630. Pureed with nectar thick liquids. Poor appetite. Encouraged intake. Patient rips off monitors and clothes. Bed alarm on.  Fa

## 2020-08-31 NOTE — PROGRESS NOTES
[facility]  Progress Note    [address] [facility] Patient Status:  Observation    10/27/1934 MRN [id number]   [facility] 5NW-A Attending Nadia Fishman MD   Hosp Day # 5 PCP Cheryle Berber, MD       SUBJECTIVE:    Awake  Unco operative  Refusing Lungs clear, normal respiratory effort  CV: Heart with regular rate and rhythm, no peripheral edema  Abd: Abdomen soft, nontender, nondistended, no organomegaly, bowel sounds present  MSK: Full range of motion in extremities, no clubbing, no cyanosis  Skin loss)     Seasonal allergies     Non-compliance     CKD (chronic kidney disease) stage 3, GFR 30-59 ml/min (HCC)     History of CVA (cerebrovascular accident)     Paranoia ([facility]     Essential hypertension with goal blood pressure less than 140/90     Anxie

## 2020-08-31 NOTE — PAYOR COMM NOTE
--------------   CONTINUED STAY REVIEW    Payor: [address] [place] [address] #:  [id number]  Authorization Number: [id number]    :     INFECTIOUS DISEASE TELEMEDICINE PROGRESS NOTE           Tim Mcclain Patient Status:  Observation    1 Pt is alert to self, mandarin speaking only. Pt yells out. 2L O2, RA baseline. Tele NSR/ST. Afebrile. VSS. Briefed and incontinent. Catheter in place. No signs on pain.  Pt was pocketing food this AM when eating breakfast, speech evaluated her and changed h Heather Zavaleta is a a(n) 80year old female with hx of CKD, chronic hepatitis B, HTN, recent hx of covid19 diagnosed on 8/19 - on decadron, UTI from enterococus anxiety / depression, dementia and sinusitis.   Consult requested for refusal to eat, malnutrition 8/31/2020 0847 Given 4 mg Intravenous Gianfranco Mabry RN      escitalopram (LEXAPRO) tablet 5 mg     Date Action Dose Route User    8/31/2020 2059 Given 5 mg Oral Gianfranco Mabry RN      Fluticasone Propionate (FLONASE) 50 MCG/ACT nasal spray 2 spray

## 2020-08-31 NOTE — CM/SW NOTE
[facility] unable to accept for palliative care services at [facility] as they do not accept pt's insurance plan. Referrals sent via [address] [place] [address] for [facility] services.   Spoke with Titus Zepeda from [facility] ([phone]) who confirmed they are likely able to accept re

## 2020-08-31 NOTE — PROGRESS NOTES
[address] Follow Up    Paxton Martha  [id number]  Patient seen at: [facility]     This patient is COVID-19+.   For purposes of responsible PPE use in this time of PPE shortage during COVID-19 pandemic, and to limit possib Subcutaneous, 2 times per day  No current outpatient medications on file.     Labs/ imaging     Hematology:  Lab Results   Component Value Date    WBC 8.3 08/29/2020    HGB 13.1 08/29/2020    HCT 38.1 08/29/2020    .0 08/29/2020       Coags:  Lab Res performed. Additional evaluation included M-mode, complete spectral Doppler, and color  Doppler. Inpatient. Room 519. No prior study was available for comparison. This was a routine echocardiographic study.  Transthoracic echocardiography  for diagnosis visualized. Doppler:  Transvalvular velocity was  within the normal range. There was no evidence for stenosis. There was  trivial regurgitation. Pericardium:  There was no pericardial effusion. Aorta:  Ascending aorta diameter was 3.6cm. Aortic root:  [place] Gallon Reference   PA pressure, S, DP                              34    mm Hg  ---------      Tricuspid valve                                 Value        Reference   Tricuspid regurg peak velocity                  2.68  m/sec  ---------   Tricuspid peak RV-RA g 8 oz (49.7 kg)   SpO2 94%   BMI 25.45 kg/m²     Deferred per above. Per notes, limited responsiveness and refusing to eat most of meals. Ate a little better this morning but mostly about 10% as documented per nursing.      Palliative Performance Scale: 30% Active Problem List:     Chronic hepatitis B ([facility]     Vitamin D deficiency     SNHL (sensorineural hearing loss)     Seasonal allergies     Non-compliance     CKD (chronic kidney disease) stage 3, GFR 30-59 ml/min ([facility])     History of CVA (cerebrovascular

## 2020-08-31 NOTE — PLAN OF CARE
Problem: Diabetes/Glucose Control  Goal: Glucose maintained within prescribed range  Description  INTERVENTIONS:  - Monitor Blood Glucose as ordered  - Assess for signs and symptoms of hyperglycemia and hypoglycemia  - Administer ordered medications to m Manage/alleviate anxiety  - Utilize distraction and/or relaxation techniques  - Monitor for opioid side effects  - Notify MD/LIP if interventions unsuccessful or patient reports new pain  - Anticipate increased pain with activity and pre-medicate as approp Encourage broncho-pulmonary hygiene including cough, deep breathe, Incentive Spirometry  - Assess the need for suctioning and perform as needed  - Assess and instruct to report SOB or any respiratory difficulty  - Respiratory Therapy support as indicated

## 2020-08-31 NOTE — CM/SW NOTE
Received referral [facility] unable to provide services due to patient insurance. Called and LM for [employer].

## 2020-08-31 NOTE — PROGRESS NOTES
[facility]  Progress Note    [address] [facility] Patient Status:  Observation    10/27/1934 MRN [id number]   [facility] 5NW-A Attending Cande Izquierdo MD   Hosp Day # 4 PCP Jocelyne Cooper MD       SUBJECTIVE:  No new events   GI  Consult  Noted 500 mL, 500 mL, Intravenous, PRN  Heparin Sodium (Porcine) 5000 UNIT/ML injection 5,000 Units, 5,000 Units, Subcutaneous, 2 times per day        Exam:  Gen - awake , no focal neurologic deficits  Pulm: Lungs clear, normal respiratory effort  CV: Heart with planned for AM    Discharge planning  Patient Active Problem List:     Chronic hepatitis B ([facility]     Vitamin D deficiency     SNHL (sensorineural hearing loss)     Seasonal allergies     Non-compliance     CKD (chronic kidney disease) stage 3, GFR 30-59 ml

## 2020-09-01 LAB — GLUCOSE BLD-MCNC: 121 MG/DL (ref 70–99)

## 2020-09-01 PROCEDURE — 0DH63UZ INSERTION OF FEEDING DEVICE INTO STOMACH, PERCUTANEOUS APPROACH: ICD-10-PCS | Performed by: INTERNAL MEDICINE

## 2020-09-01 DEVICE — SAFETY PEG KIT 20FR: Type: IMPLANTABLE DEVICE | Status: FUNCTIONAL

## 2020-09-01 RX ORDER — NALOXONE HYDROCHLORIDE 0.4 MG/ML
80 INJECTION, SOLUTION INTRAMUSCULAR; INTRAVENOUS; SUBCUTANEOUS AS NEEDED
Status: DISCONTINUED | OUTPATIENT
Start: 2020-09-01 | End: 2020-09-01 | Stop reason: HOSPADM

## 2020-09-01 RX ORDER — SODIUM CHLORIDE, SODIUM LACTATE, POTASSIUM CHLORIDE, CALCIUM CHLORIDE 600; 310; 30; 20 MG/100ML; MG/100ML; MG/100ML; MG/100ML
INJECTION, SOLUTION INTRAVENOUS CONTINUOUS
Status: DISCONTINUED | OUTPATIENT
Start: 2020-09-01 | End: 2020-09-01

## 2020-09-01 RX ORDER — HYDROCODONE BITARTRATE AND ACETAMINOPHEN 10; 325 MG/1; MG/1
2 TABLET ORAL AS NEEDED
Status: DISCONTINUED | OUTPATIENT
Start: 2020-09-01 | End: 2020-09-01 | Stop reason: HOSPADM

## 2020-09-01 RX ORDER — HEPARIN SODIUM 5000 [USP'U]/ML
5000 INJECTION, SOLUTION INTRAVENOUS; SUBCUTANEOUS EVERY 12 HOURS SCHEDULED
Status: DISCONTINUED | OUTPATIENT
Start: 2020-09-02 | End: 2020-09-04

## 2020-09-01 RX ORDER — SODIUM CHLORIDE, SODIUM LACTATE, POTASSIUM CHLORIDE, CALCIUM CHLORIDE 600; 310; 30; 20 MG/100ML; MG/100ML; MG/100ML; MG/100ML
INJECTION, SOLUTION INTRAVENOUS CONTINUOUS PRN
Status: DISCONTINUED | OUTPATIENT
Start: 2020-09-01 | End: 2020-09-01 | Stop reason: SURG

## 2020-09-01 RX ORDER — EPHEDRINE SULFATE 50 MG/ML
INJECTION, SOLUTION INTRAVENOUS AS NEEDED
Status: DISCONTINUED | OUTPATIENT
Start: 2020-09-01 | End: 2020-09-01 | Stop reason: SURG

## 2020-09-01 RX ORDER — METOCLOPRAMIDE HYDROCHLORIDE 5 MG/ML
10 INJECTION INTRAMUSCULAR; INTRAVENOUS AS NEEDED
Status: DISCONTINUED | OUTPATIENT
Start: 2020-09-01 | End: 2020-09-01 | Stop reason: HOSPADM

## 2020-09-01 RX ORDER — ONDANSETRON 2 MG/ML
4 INJECTION INTRAMUSCULAR; INTRAVENOUS AS NEEDED
Status: DISCONTINUED | OUTPATIENT
Start: 2020-09-01 | End: 2020-09-01 | Stop reason: HOSPADM

## 2020-09-01 RX ORDER — CEFAZOLIN SODIUM 1 G/3ML
INJECTION, POWDER, FOR SOLUTION INTRAMUSCULAR; INTRAVENOUS AS NEEDED
Status: DISCONTINUED | OUTPATIENT
Start: 2020-09-01 | End: 2020-09-01 | Stop reason: SURG

## 2020-09-01 RX ORDER — HYDROCODONE BITARTRATE AND ACETAMINOPHEN 10; 325 MG/1; MG/1
1 TABLET ORAL AS NEEDED
Status: DISCONTINUED | OUTPATIENT
Start: 2020-09-01 | End: 2020-09-01 | Stop reason: HOSPADM

## 2020-09-01 RX ORDER — HYDROMORPHONE HYDROCHLORIDE 1 MG/ML
0.4 INJECTION, SOLUTION INTRAMUSCULAR; INTRAVENOUS; SUBCUTANEOUS EVERY 5 MIN PRN
Status: DISCONTINUED | OUTPATIENT
Start: 2020-09-01 | End: 2020-09-01 | Stop reason: HOSPADM

## 2020-09-01 RX ORDER — LIDOCAINE HYDROCHLORIDE 10 MG/ML
INJECTION, SOLUTION EPIDURAL; INFILTRATION; INTRACAUDAL; PERINEURAL AS NEEDED
Status: DISCONTINUED | OUTPATIENT
Start: 2020-09-01 | End: 2020-09-01 | Stop reason: SURG

## 2020-09-01 RX ADMIN — LIDOCAINE HYDROCHLORIDE 25 MG: 10 INJECTION, SOLUTION EPIDURAL; INFILTRATION; INTRACAUDAL; PERINEURAL at 16:46:00

## 2020-09-01 RX ADMIN — SODIUM CHLORIDE, SODIUM LACTATE, POTASSIUM CHLORIDE, CALCIUM CHLORIDE: 600; 310; 30; 20 INJECTION, SOLUTION INTRAVENOUS at 16:58:00

## 2020-09-01 RX ADMIN — SODIUM CHLORIDE, SODIUM LACTATE, POTASSIUM CHLORIDE, CALCIUM CHLORIDE: 600; 310; 30; 20 INJECTION, SOLUTION INTRAVENOUS at 16:46:00

## 2020-09-01 RX ADMIN — CEFAZOLIN SODIUM 1 G: 1 INJECTION, POWDER, FOR SOLUTION INTRAMUSCULAR; INTRAVENOUS at 16:45:00

## 2020-09-01 RX ADMIN — EPHEDRINE SULFATE 10 MG: 50 INJECTION, SOLUTION INTRAVENOUS at 16:55:00

## 2020-09-01 NOTE — PAYOR COMM NOTE
--------------  CONTINUED STAY REVIEW    Payor: [address] #:  [id number]  Authorization Number: [id number]    Admit date: 8/26/20  Admit time: 1815    Admitting Physician: Dennis Barriga MD  Attending Physician:  Dennis Barriga MD Date Action Dose Route User    8/31/2020 2330 Given 8.6 mg Oral Gilberto Cruz RN            Plan: 8/31  Lida Kaba MD   Physician   Infectious Disease   Progress Notes   Signed   Date of Service:  8/31/2020 10:51 AM               Signed *  128* 130*  130*  --  134* 136  --    K 3.8  3.8 3.1*  3.1*   < > 3.7 3.6 4.4   CL 99  99 102  102  --  106 108  --    CO2 17.0*  17.0* 19.0*  19.0*  --  18.0* 21.0  --    ALKPHO 64 63  --   --  62  --    AST 58* 52*  --   --  40*  --    ALT 38 32    Carolina Andrade MD  [facility]  [phone]               Electronically signed by Trinh Rm MD at 8/31/2020 10:52 AM       ED to Hosp-Admission (Current) on 8/19/2020          Detailed Report      Chart Review:

## 2020-09-01 NOTE — ANESTHESIA POSTPROCEDURE EVALUATION
[address] [facility] Patient Status:  Inpatient   Age/Gender 80year old female MRN [id number]   Location [address] [facility]. Attending Penny Franks MD   [facility] Day # 6 PCP Gerber Nava MD       Anesthesia Post-op Note    Procedure(s):  ESO

## 2020-09-01 NOTE — OPERATIVE REPORT
[address] [facility] REPORT   PATIENT NAME: Lui Rojas  MRN: [id number]  DATE OF OPERATION: 9/1/2020  PREOPERATIVE DIAGNOSIS: Malnutrition; PEG placement  POSTOPERATIVE DIAGNOSIS:    1. Successful PEG placement   2.   Superficial GE junction, duodenal applied. Needle was seen to traverse the gastric wall and seen endoscopically to appear. A 1 cm incision was made along the anterior abdominal wall for passage of PEG tube.   A longer trocar was then placed in the same area and was seen to appear in the s

## 2020-09-01 NOTE — H&P
History & Physical Examination    Patient Name: Dolores Avila  MRN: [id number]  [facility]: [id number]  YOB: 1934    Diagnosis: Hypokalemia [E87.6]  Hyponatremia [E87.1]  Azotemia [R79.89]  Altered mental status, unspecified altered mental status type 4 MG/ML injection 4 mg, 4 mg, Intravenous, Q24H  escitalopram (LEXAPRO) tablet 5 mg, 5 mg, Oral, Daily  Fluticasone Propionate (FLONASE) 50 MCG/ACT nasal spray 2 spray, 2 spray, Each Nare, Daily  hydrocortisone 1 % cream, , Topical, BID  lamivudine (EPIVIR drinks      SYSTEM Check if Review is Normal Check if Physical Exam is Normal If not normal, please explain:   HEENT [x ] [x ]    NECK & BACK [x ] [x ]    HEART [x ] [x ]    LUNGS [x ] [x ]    ABDOMEN [x ] [x ]    UROGENITAL [ ] [ ]    EXTREMITIES [ ] [ ]

## 2020-09-01 NOTE — CM/SW NOTE
SYDNEY sent updates to [facility]  [phone] via [employer] informing of new peg placement.     Jozef Eli LCSW

## 2020-09-01 NOTE — PROGRESS NOTES
[facility]                INFECTIOUS DISEASE PROGRESS NOTE    Tim Mcclain Patient Status:  Observation    10/27/1934 MRN [id number]   [facility] 5NW-A Attending Ni Mosley MD   Hosp Day # 6 PCP Fay Gibson MD     Decadron #6 3.8 3.1*  3.1*   < > 3.7 3.6 4.4   CL 99  99 102  102  --  106 108  --    CO2 17.0*  17.0* 19.0*  19.0*  --  18.0* 21.0  --    ALKPHO 64 63  --   --  62  --    AST 58* 52*  --   --  40*  --    ALT 38 32  --   --  40  --    BILT 0.6 0.6  --   --  0.5  -- MD  [facility]  [phone]

## 2020-09-01 NOTE — PLAN OF CARE
Patient is A/Ox1, alert to self. Speaks Mandarin. Patient is on RA. VSS. NSR on tele, ST with exertion. Rodriguez removed at 1630, bladder scanned at 0000 with 423mL retained; Straight cathed and removed 500mls. Incontinent, briefed.  Pureed with nectar thick l NOC: Rest, prepare for PEG tube insertion in AM.      Interventions:   - Set up Ipad & assist patient with facetime  - PRN sleep aids  - Reposition as needed  - Make pt comfortable  - See additional Care Plan goals for specific interventions when possible  - Listen attentively, be patient, do not interrupt  - Minimize distractions  - Allow time for understanding and response  - Establish method for patient to ask for assistance (call light)  - Provide an  as needed  - Communicate ba and interruptions  - Encourage family to assist in orientation and promotion of home routines  Outcome: Progressing

## 2020-09-01 NOTE — SLP NOTE
/Patient is to have a PEG tube placement on this date so NPO for procedure. Unable to follow up for meal monitor to assess tolerance of po recommended diet. Will follow up on 9/2/20 as patient's participation warrants.     Maggie Mortimer, MA, CCC-SLP  [employer]

## 2020-09-01 NOTE — PAYOR COMM NOTE
--------------  CONTINUED STAY REVIEW    Payor: [address] #:  [id number]  Authorization Number: [id number]    Admit date: 8/26/20  Admit time: 1815    Admitting Physician: Jahaira Tolliver MD  Attending Physician:  Jahaira Tolliver MD Location [address] [place] [address] 5NW-A Attending Kennedy Smith MD   Hosp Day # 6 PCP Linda Turner MD      Decadron #6     Subjective:  Remains off 02  Objective:  Temp:  [95.5 °F (35.3 °C)-96.7 °F (35.9 °C)] 96.2 °F (35.7 °C)  Pulse:  [76-98] 98  Resp:  [16-18] CO2 17.0*  17.0* 19.0*  19.0*  --  18.0* 21.0  --    ALKPHO 64 63  --   --  62  --    AST 58* 52*  --   --  40*  --    ALT 38 32  --   --  40  --    BILT 0.6 0.6  --   --  0.5  --    TP 6.7 6.4  --   --  6.4  --     < > = values in this interval not displa ED to Hosp-Admission (Current) on 8/19/2020          Revision History          Detailed Report      Chart Review: Note Routing History     No routing history on file.      PLEASE FAX DAYS CERTIFIED AND NEXT REVIEW DATE

## 2020-09-01 NOTE — DIETARY MALNUTRITION NOTE
[facility]    NUTRITION ASSESSMENT    Pt meets moderate malnutrition criteria.     NUTRITION DIAGNOSIS/PROBLEM:    Malnutrition related to physiological causes as evidenced by estimated intake less than estimated needs, consuming <75% of meals for >7 d Meeting Needs: No, but supplements to maximize  Food Allergies: No  Cultural/Ethnic/Nondenominational Preferences Addresses: Yes    NUTRITION RELATED PHYSICAL FINDINGS:     1. Body Fat/Muscle Mass: NEO due to COVID restrictions per visual exam.     2. Fluid Accumu

## 2020-09-01 NOTE — PLAN OF CARE
Data: Pt A&Ox self and is primarily mandarin speaking. Pt yells out at times. RA. . Telemetry monitored. Incont. Pt has no c/o pain at this time. Pt is primarily wheelchair bound at baseline. IVF. Pt had PEG tube placed today.  [id number] [address] for meds Pt  And family assist patient with facetime  - PRN sleep aids  - Reposition as needed  - Make pt comfortable  - See additional Care Plan goals for specific interventions                    Outcome: Progressing     Problem: PAIN - ADULT  Goal: Verbalizes/displays adequate distractions  - Allow time for understanding and response  - Establish method for patient to ask for assistance (call light)  - Provide an  as needed  - Communicate barriers and strategies to overcome with those who interact with patient  Outcom of home routines  Outcome: Progressing

## 2020-09-02 LAB
ALBUMIN SERPL-MCNC: 2.9 G/DL (ref 3.4–5)
ALBUMIN/GLOB SERPL: 0.8 {RATIO} (ref 1–2)
ALP LIVER SERPL-CCNC: 81 U/L (ref 55–142)
ALT SERPL-CCNC: 86 U/L (ref 13–56)
ANION GAP SERPL CALC-SCNC: 6 MMOL/L (ref 0–18)
AST SERPL-CCNC: 54 U/L (ref 15–37)
BASOPHILS # BLD AUTO: 0.02 X10(3) UL (ref 0–0.2)
BASOPHILS NFR BLD AUTO: 0.1 %
BILIRUB SERPL-MCNC: 0.8 MG/DL (ref 0.1–2)
BUN BLD-MCNC: 32 MG/DL (ref 7–18)
BUN/CREAT SERPL: 40 (ref 10–20)
CALCIUM BLD-MCNC: 8.5 MG/DL (ref 8.5–10.1)
CHLORIDE SERPL-SCNC: 113 MMOL/L (ref 98–112)
CO2 SERPL-SCNC: 22 MMOL/L (ref 21–32)
CREAT BLD-MCNC: 0.8 MG/DL (ref 0.55–1.02)
DEPRECATED RDW RBC AUTO: 42.1 FL (ref 35.1–46.3)
EOSINOPHIL # BLD AUTO: 0 X10(3) UL (ref 0–0.7)
EOSINOPHIL NFR BLD AUTO: 0 %
ERYTHROCYTE [DISTWIDTH] IN BLOOD BY AUTOMATED COUNT: 13.4 % (ref 11–15)
GLOBULIN PLAS-MCNC: 3.8 G/DL (ref 2.8–4.4)
GLUCOSE BLD-MCNC: 83 MG/DL (ref 70–99)
HCT VFR BLD AUTO: 42.9 % (ref 35–48)
HGB BLD-MCNC: 14.3 G/DL (ref 12–16)
IMM GRANULOCYTES # BLD AUTO: 0.12 X10(3) UL (ref 0–1)
IMM GRANULOCYTES NFR BLD: 0.7 %
LYMPHOCYTES # BLD AUTO: 0.87 X10(3) UL (ref 1–4)
LYMPHOCYTES NFR BLD AUTO: 4.8 %
M PROTEIN MFR SERPL ELPH: 6.7 G/DL (ref 6.4–8.2)
MCH RBC QN AUTO: 29.5 PG (ref 26–34)
MCHC RBC AUTO-ENTMCNC: 33.3 G/DL (ref 31–37)
MCV RBC AUTO: 88.6 FL (ref 80–100)
MONOCYTES # BLD AUTO: 1.01 X10(3) UL (ref 0.1–1)
MONOCYTES NFR BLD AUTO: 5.6 %
NEUTROPHILS # BLD AUTO: 15.95 X10 (3) UL (ref 1.5–7.7)
NEUTROPHILS # BLD AUTO: 15.95 X10(3) UL (ref 1.5–7.7)
NEUTROPHILS NFR BLD AUTO: 88.8 %
OSMOLALITY SERPL CALC.SUM OF ELEC: 298 MOSM/KG (ref 275–295)
PLATELET # BLD AUTO: 361 10(3)UL (ref 150–450)
POTASSIUM SERPL-SCNC: 4.2 MMOL/L (ref 3.5–5.1)
RBC # BLD AUTO: 4.84 X10(6)UL (ref 3.8–5.3)
SODIUM SERPL-SCNC: 141 MMOL/L (ref 136–145)
WBC # BLD AUTO: 18 X10(3) UL (ref 4–11)

## 2020-09-02 NOTE — PROGRESS NOTES
[facility]                INFECTIOUS DISEASE PROGRESS NOTE    Tim Mcclain Patient Status:  Observation    10/27/1934 MRN [id number]   [facility] 5NW-A Attending Leana Ferguson MD   Hosp Day # 7 PCP Haroldo Barlow MD     Decadron #7 [phone]  102  --  106 108  --  113*   CO2 19.0*  19.0*  --  18.0* 21.0  --  22.0   ALKPHO 63  --   --  62  --  81   AST 52*  --   --  40*  --  54*   ALT 32  --   --  40  --  86*   BILT 0.6  --   --  0.5  --  0.8   TP 6.4  --   --  6.4  --  6.7    < > = values [facility]  [phone]

## 2020-09-02 NOTE — PLAN OF CARE
Pt A&O x1, yells frequently, mandarin speaking. RA. Sats >97%. . VSS. NSR on tele. Restarting heparin tmrw @0900. Briefed and incontinent. Pt had PEG placed today, used for NOC meds and flushed. No residual. IVF 75ml/hr. Starting PO decadron tmrw.  Pt carrillo Interventions:   - Set up Ipad & assist patient with facetime  - PRN sleep aids  - Reposition as needed  - Make pt comfortable  - See additional Care Plan goals for specific interventions                     Outcome: Progressing     Problem: PAIN - MITALI patient, do not interrupt  - Minimize distractions  - Allow time for understanding and response  - Establish method for patient to ask for assistance (call light)  - Provide an  as needed  - Communicate barriers and strategies to overcome with t to assist in orientation and promotion of home routines  Outcome: Progressing

## 2020-09-02 NOTE — PLAN OF CARE
Data: Pt A&Ox self and is primarily mandarin speaking. Pt yells out at times. RA. . Telemetry monitored. Incont. Pt resting for most of shift . Pt is primarily wheelchair bound at baseline. IVF d/c'd.   Pt PEG tube okay for tube feedings today and started situation with PEG tube  8/31 NOC: Rest, prepare for PEG tube insertion in AM.  9/1 AM: Have PEG placed today and remain comfortable   9/1NOC: rest, stay safe   9/2 AM: remain safe and start tube feedings    Interventions:   - Set up Ipad & assist patient care  Description  Interventions:  - Assess communication ability and preferred communication style  - Implement communication aides and strategies  - Use visual cues when possible  - Listen attentively, be patient, do not interrupt  - Minimize distraction frequent reorientation  - Promote wakefulness i.e. lights on, blinds open  - Promote sleep, encourage patient's normal rest cycle i.e. lights off, TV off, minimize noise and interruptions  - Encourage family to assist in orientation and promotion of home r

## 2020-09-02 NOTE — PROGRESS NOTES
[facility]  Progress Note    [address] [facility] Patient Status:  Observation    10/27/1934 MRN [id number]   [facility] 5NW-A Attending Lexi Vargas MD   Hosp Day # 6 PCP Elizabeth Peoples MD       SUBJECTIVE:  Sedated,   Had G tube placement  e 5 mg, Oral, Nightly  sodium chloride 0.9% IV bolus 500 mL, 500 mL, Intravenous, PRN        Exam:  Gen - awake, screaming  , no focal neurologic deficits  Pulm: Lungs clear, normal respiratory effort  CV: Heart with regular rate and rhythm, no peripheral ed Vitamin D deficiency     SNHL (sensorineural hearing loss)     Seasonal allergies     Non-compliance     CKD (chronic kidney disease) stage 3, GFR 30-59 ml/min ([facility])     History of CVA (cerebrovascular accident)     Paranoia ([facility] [address]     Essential hypertensio

## 2020-09-02 NOTE — PAYOR COMM NOTE
--------------  CONTINUED STAY REVIEW------REQUESTING ADDITIONAL DAY 9/2      Payor: [id number] [place] [address] #:  [id number]  Authorization Number: [id number]    Admit date: 8/26/20  Admit time: 1815    Admitting Physician: Leana Ferguson MD  Attshahriar Calcium Carbonate Antacid (TUMS) chewable tab 500 mg, 500 mg, Oral, Q6H PRN  cholecalciferol (VITAMIN D3) cap/tab 2,000 Units, 2,000 Units, Oral, Daily  guaiFENesin (ROBITUSSIN) 100mg/5ml LIQUID 100 mg, 100 mg, Oral, Q6H PRN  Senna (SENOKOT) tab 8.6 mg, 8.   Urine Culture >100,000 CFU/ML Enterococcus species not VRE (A) N/A      Assessment & Plan:  Dysphagia  S/p Peg  Placement- start feeding today   LEUCOCYTOSIS - ? Reactive , ?  From steroid   Duodenal ulcers and esophagitis , S/p Biopsy   Urinary re tensio cholecalciferol (VITAMIN D3) cap/tab 2,000 Units     Date Action Dose Route User    9/2/2020 0949 Given 2000 Units Oral Jennyfer Mahmood RN      EPHEDrine Sulfate injection     Date Action Dose Route User    9/1/2020 1655 Given 10 mg Intravenous Stephanie Gilbert Date Action Dose Route User    9/1/2020 1646 Given 100 mg Intravenous MD Johanna Johnson [facility]) tab 8.6 mg     Date Action Dose Route User    9/2/2020 0949 Given 8.6 mg Oral Idania Aragon RN          Procedures:      Plan:

## 2020-09-02 NOTE — CM/SW NOTE
Plan of care reviewed for care coordination and discharge planning. Pt with COVID-19 infection, and UTI, with dysphagia s/p PEG placement yesterday. Hx Dementia with behavioral problems. Plan to start tube feeds today. Possible discharge tomorrow?     Antic [de-identified] : PT SEEN BY PSYCH REGULARLY ON SAME DOSE OF VENLAFAXINE(2 TABS OF DIF STRENGTH TOTAL ? 400 MG DAILY)\par CC OF BLOOD IN STOOL WITH EACH BM FOR LAST 6 MONTHS ,MILD CONSTIPATION ,NEVER EVALUATED FOR IT\par STOP GYM MONTHS AGO AND LOST WT

## 2020-09-02 NOTE — PROGRESS NOTES
[facility]  Progress Note    [address] [facility] Patient Status:  Observation    10/27/1934 MRN [id number]   [facility] 5NW-A Attending Lesly Cheng MD   Hosp Day # 7 PCP Rosalinda Mcguire MD       SUBJECTIVE  Screening,    OBJECTIVE:  Vital sig mL, 500 mL, Intravenous, PRN        Exam:  Gen - awake, screaming  , no focal neurologic deficits  Pulm: Lungs clear, normal respiratory effort  CV: Heart with regular rate and rhythm, no peripheral edema  Abd: Abdomen soft, nontender, nondistended, no org Chronic hepatitis B (HCC)     Vitamin D deficiency     SNHL (sensorineural hearing loss)     Seasonal allergies     Non-compliance     CKD (chronic kidney disease) stage 3, GFR 30-59 ml/min (HCC)     History of CVA (cerebrovascular accident)     Paranoia (

## 2020-09-03 VITALS
BODY MASS INDEX: 25.34 KG/M2 | WEIGHT: 109.5 LBS | TEMPERATURE: 98 F | HEIGHT: 55 IN | RESPIRATION RATE: 18 BRPM | OXYGEN SATURATION: 98 % | HEART RATE: 90 BPM | DIASTOLIC BLOOD PRESSURE: 54 MMHG | SYSTOLIC BLOOD PRESSURE: 109 MMHG

## 2020-09-03 PROBLEM — B19.10 HEPATITIS B VIRUS INFECTION: Status: ACTIVE | Noted: 2020-09-03

## 2020-09-03 LAB
ANION GAP SERPL CALC-SCNC: 2 MMOL/L (ref 0–18)
BASOPHILS # BLD AUTO: 0.03 X10(3) UL (ref 0–0.2)
BASOPHILS NFR BLD AUTO: 0.2 %
BUN BLD-MCNC: 37 MG/DL (ref 7–18)
BUN/CREAT SERPL: 37.8 (ref 10–20)
CALCIUM BLD-MCNC: 8.8 MG/DL (ref 8.5–10.1)
CHLORIDE SERPL-SCNC: 109 MMOL/L (ref 98–112)
CO2 SERPL-SCNC: 24 MMOL/L (ref 21–32)
CREAT BLD-MCNC: 0.98 MG/DL (ref 0.55–1.02)
DEPRECATED RDW RBC AUTO: 40.9 FL (ref 35.1–46.3)
EOSINOPHIL # BLD AUTO: 0.03 X10(3) UL (ref 0–0.7)
EOSINOPHIL NFR BLD AUTO: 0.2 %
ERYTHROCYTE [DISTWIDTH] IN BLOOD BY AUTOMATED COUNT: 13.3 % (ref 11–15)
GLUCOSE BLD-MCNC: 114 MG/DL (ref 70–99)
HCT VFR BLD AUTO: 43.5 % (ref 35–48)
HGB BLD-MCNC: 14.5 G/DL (ref 12–16)
IMM GRANULOCYTES # BLD AUTO: 0.12 X10(3) UL (ref 0–1)
IMM GRANULOCYTES NFR BLD: 0.8 %
LYMPHOCYTES # BLD AUTO: 0.91 X10(3) UL (ref 1–4)
LYMPHOCYTES NFR BLD AUTO: 6.2 %
MCH RBC QN AUTO: 29.3 PG (ref 26–34)
MCHC RBC AUTO-ENTMCNC: 33.3 G/DL (ref 31–37)
MCV RBC AUTO: 87.9 FL (ref 80–100)
MONOCYTES # BLD AUTO: 0.92 X10(3) UL (ref 0.1–1)
MONOCYTES NFR BLD AUTO: 6.3 %
NEUTROPHILS # BLD AUTO: 12.6 X10 (3) UL (ref 1.5–7.7)
NEUTROPHILS # BLD AUTO: 12.6 X10(3) UL (ref 1.5–7.7)
NEUTROPHILS NFR BLD AUTO: 86.3 %
OSMOLALITY SERPL CALC.SUM OF ELEC: 290 MOSM/KG (ref 275–295)
PLATELET # BLD AUTO: 309 10(3)UL (ref 150–450)
POTASSIUM SERPL-SCNC: 4 MMOL/L (ref 3.5–5.1)
RBC # BLD AUTO: 4.95 X10(6)UL (ref 3.8–5.3)
SODIUM SERPL-SCNC: 135 MMOL/L (ref 136–145)
WBC # BLD AUTO: 14.6 X10(3) UL (ref 4–11)

## 2020-09-03 RX ORDER — PANTOPRAZOLE SODIUM 40 MG/1
40 TABLET, DELAYED RELEASE ORAL EVERY MORNING
Status: DISCONTINUED | OUTPATIENT
Start: 2020-09-03 | End: 2020-09-04

## 2020-09-03 RX ORDER — PANTOPRAZOLE SODIUM 40 MG/1
40 TABLET, DELAYED RELEASE ORAL
Refills: 0 | Status: SHIPPED | COMMUNITY
Start: 2020-09-03

## 2020-09-03 NOTE — CM/SW NOTE
Pt medically cleared for transfer to [facility] today. Per [employer], Merry Peña ([phone]) contacted and S transportation arrangements made with request for a 4:00pm  time. Reservation #[id number] received. PCS completed via Epic.    CINDY kothari

## 2020-09-03 NOTE — PROGRESS NOTES
[facility]                INFECTIOUS DISEASE PROGRESS NOTE    Tim Mcclain Patient Status:  Observation    10/27/1934 MRN [id number]   [facility] 5NW-A Attending Mikaela Bailey MD   Hosp Day # 8 PCP MD Amos Odonnell: saniya [address] Encounter on 08/19/20   1.  BLOOD CULTURE     Status: None    Collection Time: 08/26/20  6:10 PM   Result Value Ref Range    Blood Culture Result No Growth 5 Days N/A   2. URINE CULTURE, ROUTINE     Status: Abnormal    Colle

## 2020-09-03 NOTE — PLAN OF CARE
Alert to self. Mandarin speaking. Drowsy. PEG tube, TF tolerated no residuals noted. Voids. W/c bound per baseline. Video monitoring for safety. Family updated on POC. Verbalized understanding. All needs met. Repositioned q2h. Will continue to monitor. Progressing     Problem: PAIN - ADULT  Goal: Verbalizes/displays adequate comfort level or patient's stated pain goal  Description  INTERVENTIONS:  - Encourage pt to monitor pain and request assistance  - Assess pain using appropriate pain scale  - Adminis strategies to overcome with those who interact with patient  Outcome: Progressing     Problem: RESPIRATORY - ADULT  Goal: Achieves optimal ventilation and oxygenation  Description  INTERVENTIONS:  - Assess for changes in respiratory status  - Assess for ch

## 2020-09-03 NOTE — PROGRESS NOTES
Multidisciplinary Discharge Rounds held 9/3/2020. Treatment team members present today include , , Charge Nurse, Nurse, RT, PT and Pharmacy caring for [employer].      Other care providers present:    Mobility Goal:    Readmissi

## 2020-09-03 NOTE — DISCHARGE SUMMARY
[facility]  Discharge Summary    Zhalton Mcclain Patient Status:  Inpatient    10/27/1934 MRN [id number]   [facility] 5NW-A Attending Jahaira Tolliver MD   [facility] Day # 8 PCP Nura Blackburn MD     Date of Admission: 2020    Date of Dischar breakfast.  Refills: 0      CONTINUE these medications which have NOT CHANGED    ALPRAZolam 0.25 MG Oral Tab  Take 0.25 mg by mouth nightly as needed. PEG 3350 17 g Oral Powd Pack  Take 17 g by mouth daily as needed.     guaiFENesin 100 MG/5ML Oral Solut

## 2020-09-03 NOTE — CM/SW NOTE
Neal Richard has authorized   [id number]  Authorized dates 9/2/2020-09/08/2020. Information updated via koffi ramírez/MBM. Await medical clearance.     Deshaun Gómez RN,   Phone [phone]

## 2020-09-04 NOTE — PAYOR COMM NOTE
--------------  DISCHARGE REVIEW    Payor: [id number] [place] [address] #:  [id number]  Authorization Number: [id number]    Admit date: 8/26/20  Admit time:  1815  Discharge Date: 9/3/2020 10:03 PM     Admitting Physician: Janis Brown MD  Attending (degenerative disc disease), lumbar     DDD (degenerative disc disease), cervical     COVID-19     Altered mental status, unspecified altered mental status type     Hyponatremia     Hypokalemia     Azotemia     Palliative care encounter     Goals of care, UNITS Oral Tab  Take  by mouth. Follow up Visits: Follow-up with Physicians as directed.         Linda Turner  9/3/2020  1:33 PM    Electronically signed by Kennedy Smith MD on 9/3/2020  1:37 PM         REVIEWER COMMENTS

## 2020-09-04 NOTE — PROGRESS NOTES
NURSING DISCHARGE NOTE    Discharged Nursing home via Ambulance. Accompanied by Support staff  Belongings Taken by patient/family. Ambulance picked up pt at 2130. Pt sleeping at time of discharge. Vital signs stable.  No signs of respiratory dis

## 2020-09-04 NOTE — CM/SW NOTE
09/04/20 0700   Discharge disposition   Expected discharge disposition Skilled Nurs   Name of [address]   Discharge transportation [employer]

## 2020-12-15 ENCOUNTER — MED REC SCAN ONLY (OUTPATIENT)
Dept: FAMILY MEDICINE CLINIC | Facility: CLINIC | Age: 85
End: 2020-12-15

## 2020-12-31 NOTE — PAYOR COMM NOTE
--------------  CONTINUED STAY REVIEW    Payor: [employer]  Subscriber #:  [id number]  Authorization Number: [id number]       Case Management   PEACE/CINDY Note   Signed   Date of Service:  8/21/2020  2:18 PM               Signed regular

## 2021-03-05 DIAGNOSIS — Z23 NEED FOR VACCINATION: ICD-10-CM

## 2021-04-22 PROBLEM — F51.05 INSOMNIA RELATED TO ANOTHER MENTAL DISORDER: Chronic | Status: ACTIVE | Noted: 2021-04-22

## 2021-04-22 PROBLEM — F29 PSYCHOTIC DISORDER (HCC): Chronic | Status: ACTIVE | Noted: 2021-04-22

## 2021-05-06 PROBLEM — F29 PSYCHOSIS (HCC): Status: ACTIVE | Noted: 2021-04-22

## 2022-01-13 PROBLEM — F29 PSYCHOSIS (HCC): Chronic | Status: ACTIVE | Noted: 2021-04-22

## 2023-03-27 NOTE — PLAN OF CARE
Problem: Impaired Functional Mobility  Goal: Achieve highest/safest level of mobility/gait  Interventions:  - Assess patient's functional ability and stability  - Promote increasing activity/tolerance for mobility and gait  - Educate and engage patient/fam Hydroquinone Counseling:  Patient advised that medication may result in skin irritation, lightening (hypopigmentation), dryness, and burning.  In the event of skin irritation, the patient was advised to reduce the amount of the drug applied or use it less frequently.  Rarely, spots that are treated with hydroquinone can become darker (pseudoochronosis).  Should this occur, patient instructed to stop medication and call the office. The patient verbalized understanding of the proper use and possible adverse effects of hydroquinone.  All of the patient's questions and concerns were addressed.

## 2023-07-31 NOTE — ED NOTES
Ambulance ETA to [facility] 60 to 90 minutes [facility] Quality Flow/Interdisciplinary Rounds Progress Note        Quality Flow Rounds held on July 31, 2023    Disciplines Attending:  Bedside Nurse, , , and Nursing Unit Leadership    Radha Vieira was admitted on 7/27/2023  4:31 PM    Anticipated Discharge Date:       Disposition:    Kt Score:  Kt Scale Score: 20    Readmission Risk              Risk of Unplanned Readmission:  5           Discussed patient goal for the day, patient clinical progression, and barriers to discharge.   The following Goal(s) of the Day/Commitment(s) have been identified:  Discharge - Obtain Order and patient to tolerate diet      Ariana Herrera RN  July 31, 2023

## (undated) DEVICE — STOCKINETTE HYDROMED 8X6

## (undated) DEVICE — 1200CC GUARDIAN II: Brand: GUARDIAN

## (undated) DEVICE — KENDALL SCD EXPRESS SLEEVES, KNEE LENGTH, MEDIUM: Brand: KENDALL SCD

## (undated) DEVICE — Device: Brand: POWER-FLO®

## (undated) DEVICE — SOL  .9 3000ML

## (undated) DEVICE — 2C14 #2 PDO 45 X 45: Brand: 2C14 #2 PDO 45 X 45

## (undated) DEVICE — SUTURE VICRYL 0 CP-1

## (undated) DEVICE — SUTURE VICRYL 2-0 CP-1

## (undated) DEVICE — DRESSING AQUACEL AG 3.5 X 10

## (undated) DEVICE — STERILE POLYISOPRENE POWDER-FREE SURGICAL GLOVES: Brand: PROTEXIS

## (undated) DEVICE — GLOVE SURG SENSICARE SZ 6-1/2

## (undated) DEVICE — Device: Brand: STABLECUT®

## (undated) DEVICE — Device: Brand: DEFENDO AIR/WATER/SUCTION AND BIOPSY VALVE

## (undated) DEVICE — TOTAL HIP CDS: Brand: MEDLINE INDUSTRIES, INC.

## (undated) DEVICE — FILTERLINE NASAL ADULT O2/CO2

## (undated) DEVICE — REM POLYHESIVE ADULT PATIENT RETURN ELECTRODE: Brand: VALLEYLAB

## (undated) DEVICE — 3M™ STERI-DRAPE™ U-DRAPE 1015: Brand: STERI-DRAPE™

## (undated) DEVICE — SUTURE ETHIBOND 5 V-37

## (undated) DEVICE — 1010 S-DRAPE TOWEL DRAPE 10/BX: Brand: STERI-DRAPE™

## (undated) DEVICE — SOL  .9 1000ML BTL

## (undated) DEVICE — GLOVE SURG SENSICARE SZ 7

## (undated) DEVICE — PAD SACRAL SPAN AID

## (undated) DEVICE — 3M™ RED DOT™ MONITORING ELECTRODE WITH FOAM TAPE AND STICKY GEL, 50/BAG, 20/CASE, 72/PLT 2570: Brand: RED DOT™

## (undated) DEVICE — ENDOSCOPY PACK - LOWER: Brand: MEDLINE INDUSTRIES, INC.

## (undated) DEVICE — SUTURE ETHIBOND 1 OS-6

## (undated) NOTE — LETTER
Jaimie Lyon [address]  [address] [facility] [address], [address] [facility]  Authorization for Surgical Operation and Procedure     Date:___________                                                                                                         Time:__________ and/or blood products. The following are some, but not all, of the potential risks that can occur: fever and allergic reactions, hemolytic reactions, transmission of diseases such as Hepatitis, AIDS and Cytomegalovirus (CMV) and fluid overload.   In the ev (or a person authorized to consent on my behalf). The surgeon or my attending physician will determine when the applicable recovery period ends for purposes of reinstating the DNAR order.   10. Patients having a sterilization procedure: I understand that if 2. As the patient asking for anesthesia services, I agree to:  a. Allow the anesthesiologist (anesthesia doctor) to give me medicine and do additional procedures as necessary.  Some examples are: Starting or using an “IV” to give me medicine, fluids or bloo Very rare risks include infection, bleeding, seizure, irregular heart rhythms and nerve injury. 7. Regional Anesthesia (“spinal”, “epidural”, & “nerve blocks”):   I understand that rare but potential complications include headache, bleeding, infection, sei

## (undated) NOTE — IP AVS SNAPSHOT
[address]            (For Outpatient Use Only) Initial Admit Date: 8/8/2018   Inpt/Obs Admit Date: Inpt: 8/8/18 / Obs: N/A   Discharge Date:    Arianne [place]:  [de-identified]   MRN: [de-identified]   CSN: [id number]        ENCOUNTER  Patient C Hospital Account Financial Class: Medicare Advantage    August 11, 2018

## (undated) NOTE — LETTER
[facility] [address], [address] [place] [address]    Consent for Anesthesia   1.    Karon Fothergill agree to be cared for by a physician anesthesiologist alone and/or with a nurse anesthetist, who is specially trained to monitor me and give me medi allergic reactions to medications, injury to my airway, heart, lungs, vision, nerves, or muscles and in extremely rare instances death. 5. My doctor has explained to me other choices available to me for my care (alternatives).   6. Pregnant Patients (“epid Printed: 9/1/2020 at 4:30 PM    Medical Record #: [id number]                                            Page 1 of 1

## (undated) NOTE — ED AVS SNAPSHOT
Paxton Thomas   MRN: [id number]    Department:  [facility] Emergency Department   Date of Visit:  4/12/2019           Disclosure     Insurance plans vary and the physician(s) referred by the ER may not be covered by your plan.  Please contact your in tell this physician (or your personal doctor if your instructions are to return to your personal doctor) about any new or lasting problems. The primary care or specialist physician will see patients referred from the [facility] Emergency Department.  Jessica Barroso

## (undated) NOTE — IP AVS SNAPSHOT
Patient Demographics     Address  [address] [place] [facility] [address] Phone  [phone] [facility])  [phone] (Mobile) *Preferred* E-mail Address  [email or web address]      Emergency Contact(s)     Name Relation Home Work [address] [place] [address] E ? Usually allowed after four to six weeks – check with surgeon at your office visit. Return to work  ? Usually allowed after four to six weeks. Discuss specific work activities with your surgeon. Restrictions  ?  For hip replacement surgery, fol blood in your urine. Use electric razors and soft toothbrushes only. ? Do not take aspirin while taking blood thinners unless ordered by your physician. ? Review anticoagulant education information sheet provided. Discomfort  ?  Surgical discomfort is ? An enema or suppository may be needed if above measures do not work. Prevention of infection and promotion of healing  ? Good hand washing is important.  Everyone should wash their hands or use hand  as soon as they walk in your house-[facility] [place] ? Increased or foul smelling drainage from incision  ? Red streaks on skin near incision. ? Temperature >100.4F.  ? Increased pain at incision not relieved by pain medication. Signs of Possible Dislocation  ? Increased severe leg or groin pain  ?  Marcos Dial space to open car doors to position yourself properly with walker to get in and out of your car safely; some parking spaces are  practically on top of each other and do not give you enough room. SPECIAL  INSTRUCTIONS:    Resume BP meds in one week. Next dose due:  8/7 @ 9pm      Take 5 mg by mouth nightly. omeprazole 20 MG Cpdr  Commonly known as:  PRILOSEC      Take 20 mg by mouth daily as needed.           Sertraline HCl 50 MG Tabs  Commonly known as:  ZOLOFT  Next dose due:  8/8 @ 9am [id number] aspirin tab 325 mg 08/07/18 0912 Given      [id number] docusate sodium (COLACE) cap 100 mg 08/06/18 2005 Given      [id number] docusate sodium (COLACE) cap 100 mg 08/07/18 0912 Given      [id number] sodium chloride 0.9% IV bolus 1,000 mL 08/06/18 1 Blood — 08/07/18 0510          Components    Component Value Reference Range Flag Lab   Magnesium 2.3 1.8 - 2.5 mg/dL — [facility]            CBC, PLATELET; NO DIFFERENTIAL [[id number]] (Abnormal)  Resulted: 08/07/18 0532, Result status: Final result   Orde HPI 79 yo female with multiple medical problems including but not limited to hx  anxiety, CKD, HTN,[ID.1] hepatitis,  Dementia, psychosis[ID.3] here as a transfer from [facility]. Patient had a fall,[ID.4]  reportedly no syncope or loss of con Surgeon:  Gisselle Dalal MD;  Location: [address] [place] [address]  1/6/2016: [address] CATARACT EXTRACAP,INSERT LENS Right      Comment: Procedure: RIGHT PHACOEMULSIFICATION OF                CATARACT WITH INTRAOCULAR LENS IMPLANT 355 Abd; soft, not tender, +BS  Ext: no[ID.1] pitting[ID.4] edema[ID.1]  Vascular + b/l DP pulses  Psych calm[ID.4]    Labs[ID.1]  WBC 12.8 Hg 13.4 Plt 169[ID.4]  BUN 27, creatinine 1.06[ID.1]  CT head or brain wo con:  No acute intracranial hemorrhage or skul ID.1 Diana Rios MD on 8/4/2018  9:31 AM  ID. 2 - Rut Valdez MD on 8/4/2018  9:32 AM  ID. 3 - Rut Valdez MD on 8/4/2018 12:21 PM  ID. 4 - Rut Valdez MD on 8/4/2018  3:11 PM  ID. 5 - Rut Valdez MD on 8/4/2018  9:33 AM  ID. 6 - Rut Valdez • Chronic urticaria     has seen dermatology   • CKD (chronic kidney disease) stage 3, GFR 30-59 ml/min ([facility])    • Depression    • Hearing impairment    • Hearing loss     left hearing aid   • High blood pressure    • Unspecified essential hypertension once daily. Disp: 90 capsule Rfl: 3   benzonatate 200 MG Oral Cap Take 1 capsule (200 mg total) by mouth 3 (three) times daily as needed. Disp: 30 capsule Rfl: 0   aspirin 81 MG Oral Tab Take 1 tablet by mouth daily.  Disp: 90 tablet Rfl: 3   Cholecalcifero Has UTI which may have controbuted  Had CT head/neck at OSH  Ceftriaxone IV for UTI[ID.3] (was initiated at OSH), will need to touch base with [facility] lab during admit to check on urine culture reuslt[ID.4]    HTN[ID.3]  Medication ord [facility]. Patient had a fall,  reportedly no syncope or loss of consciousness. Per patient reportedly   tripped i fell onto her left hip and hit side of her head. Per [facility] notes patient had no acute changes on CT head and neck. Weight Bearing Restriction: L lower extremity           L Lower Extremity: Weight Bearing as Tolerated    PAIN ASSESSMENT   Rating: Unable to rate  Location: Left hip pain during mobility  Management Techniques: Activity promotion; Body mechanics;Breathing edge of bed. Sat with fair balance,Sit to stand with RW required max assist of 2. Patient ambulated <> 4 ft with max assist of 2 - needing assist to advance left LE & chair to follow close. Patient sat in chair with max assist of 2.  Patient was left up in b Rehab Potential : Fair  Frequency (Obs): Daily   CURRENT GOALS     Goal #1 Patient is able to demonstrate supine - sit EOB @ level: minimum assistance      Goal #2 Patient is able to demonstrate transfers EOB to/from Chair/Wheelchair at assistance level: m Past Medical History  Past Medical History:   Diagnosis Date   • ANXIETY    • Cataract    • Cataracts, bilateral    • Chronic urticaria     has seen dermatology   • CKD (chronic kidney disease) stage 3, GFR 30-59 ml/min ([facility])    • Depression    • Hearing i to cooperate,    OBJECTIVE  Precautions: KEVIN - posterior;  needed  Fall Risk: High fall risk    WEIGHT BEARING RESTRICTION  Weight Bearing Restriction: L lower extremity           L Lower Extremity: Weight Bearing as Tolerated    PAIN ASSESSMENT How much help from another person does the patient currently need. ..   -   Moving to and from a bed to a chair (including a wheelchair)?: A Lot   -   Need to walk in hospital room?: A Lot   -   Climbing 3-5 steps with a railing?: Total       AM-PAC Score: In this PT evaluation, the patient presents with the following impairments decreased ROM & strength in left hip & knee, pain in left hip, resistive to mobility, fearful of pain. Functional outcome measures completed include AM PAC scores.   Based on this e Occupational Therapy Notes (last 72 hours) (Notes from 8/4/2018  2:39 PM through 8/7/2018  2:39 PM)      Occupational Therapy Note signed by Derek Schneider OT at 8/6/2018  2:12 PM  Version 1 of 1    Author:  Derek Schneider OT Service:  Sally Jorgensen 01/30/2018= Hemorrhoids, :Polyp (HP0, Bx normal: COLONOSCOPY      Comment: Repeat PRN  12/16/2015: REMV CATARACT EXTRACAP,INSERT LENS Left      Comment: Procedure: LEFT PHACOEMULSIFICATION OF                CATARACT WITH INTRAOCULAR LENS IMPLANT 86844; Management Techniques: Activity promotion; Body mechanics;Breathing techniques;Relaxation;Repositioning[BW.2]    COGNITION  Arousal/Alertness:  delayed responses to stimuli and lethargic  Attention Span:  attends with cues to redirect  Following Commands: Education on hip precautions and incorporation into ADLs; patient required mod cueing to follow throughout session; education on bed mobility, performed supine to sit with max assist x2, greatly increased time, and HOB elevated; sitting EOB several minutes Inpatient Activities of Daily Living Short Form for the patient is 63.03% degree of basic ADL impairment. Research supports that patients with this level of impairment often benefit from KENDY at discharge.  The patient is below her baseline and would benefit ADDITIONAL GOALS   Patient will recall all hip precautions with mod cueing and incorporate into ADL tasks[BW.1]       Attribution Marie    BW.1 - Sharyle Sever, OT on 8/6/2018  1:55 PM  BW.2 - Sharyle Sever, OT on 8/6/2018  1:56 PM  BW.3 - Vanessa Singleton

## (undated) NOTE — IP AVS SNAPSHOT
[address]            (For Outpatient Use Only) Initial Admit Date: 8/3/2018   Inpt/Obs Admit Date: Inpt: 8/3/18 / Obs: N/A   Discharge Date:    Maia Reddy:  [de-identified]   MRN: [de-identified]   CSN: [id number]        ENCOUNTER  Patient C August 7, 2018

## (undated) NOTE — LETTER
Jaimie Lyon 182 6 [address]  [place], [address] [facility]    Consent for Operation  Date: __________________                                Time: _______________    1.  I authorize the performance upon [facility]  the following operation:  Procedure(s): procedure has been videotaped, the surgeon will obtain the original videotape. The hospital will not be responsible for storage or maintenance of this tape.     6. For the purpose of advancing medical education, I consent to the admittance of observers to t STATEMENTS REQUIRING INSERTION OR COMPLETION WERE FILLED IN.     Signature of Patient:   ___________________________    When the patient is a minor or mentally incompetent to give consent:  Signature of person authorized to consent for patient: ____________

## (undated) NOTE — IP AVS SNAPSHOT
[address]            (For Outpatient Use Only) Initial Admit Date: 8/19/2020   Inpt/Obs Admit Date: Inpt: 8/26/20 / Obs: 08/20/20   Discharge Date:    Mariia Parks:  [de-identified]   MRN: [de-identified]   CSN: [id number]   CEID: [id number] Subscriber Name:  Krissy Cowart :    Subscriber ID:  Pt Rel to Subscriber:    Hospital Account Financial Class: Medicare Advantage    September 3, 2020

## (undated) NOTE — LETTER
[facility]  Jorgeshyla Real [address] [facility], [address] [place] [address]    Consent for Operation    Date: __________________    Time: _______________    1.  I authorize the performance upon Fortino Greenwood the following operation:    Procedure(s):  HIP HEMIARTHROPLASTY/ BI procedure has been videotaped, the surgeon will obtain the original videotape. The hospital will not be responsible for storage or maintenance of this tape.     6. For the purpose of advancing medical education, I consent to the admittance of observers to t STATEMENTS REQUIRING INSERTION OR COMPLETION WERE FILLED IN.     Signature of Patient:   ___________________________    When the patient is a minor or mentally incompetent to give consent:  Signature of person authorized to consent for patient: ____________ drugs/illegal medications). Failure to inform my anesthesiologist about these medicines may increase my risk of anesthetic complications. · If I am allergic to anything or have had a reaction to anesthesia before.     3. I understand how the anesthesia med I have discussed the procedure and information above with the patient (or patient’s representative) and answered their questions. The patient or their representative has agreed to have anesthesia services.     _______________________________________________

## (undated) NOTE — IP AVS SNAPSHOT
Patient Demographics     Address  [address]  [facility] [address] Phone  [phone] [facility])  [phone] (Mobile) *Preferred* E-mail Address  [email or web address]      Emergency Contact(s)     Name Relation Home Work BRAILSFORD, Take 100 mg by mouth every 6 (six) hours as needed. Lexapro 10 MG Tabs  Generic drug:  escitalopram      Take 1 tablet (10 mg total) by mouth daily. Ligia Ruby MD         Melatonin 1 MG Caps      Take 7.5 mg by mouth nightly.           Pantoprazol LEFT LOWER ABDOMEN     Order ID Medication Name Action Time Action Reason Comments    [id number] Heparin Sodium (Porcine) 5000 UNIT/ML injection 5,000 Units 09/02/20 2011 Given      [id number] Heparin Sodium (Porcine) 5000 UNIT/ML injection 5,000 Units 0 659 [facility] ([facility])   Narrative: The following orders were created for panel order CBC WITH DIFFERENTIAL WITH PLATELET.   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 2]        Allergies:[KK.1]   Sulfa Antibiotics           Past Surgical History:   Procedure Laterality Date   • COLONOSCOPY  01/30/2018= Hemorrhoids, :Polyp (HP0, Bx normal    Repeat PRN   • Author:  Mian Tillman MD Service:  Gastroenterology Author Type:  Physician    Filed:  8/30/2020 12:04 PM Date of Service:  8/30/2020 11:30 AM Status:  Signed    :  Mian Tillman MD (Physician)     Consult Orders    1.  Consult to [employer] Tenofovir since 2010.   Switch planned to Entecavir due to high creatinine     Past Surgical History:   Procedure Laterality Date   • COLONOSCOPY  01/30/2018= Hemorrhoids, :Polyp (HP0, Bx normal    Repeat PRN   • HIP HEMIARTHROPLASTY/ BIPOLAR Right 8/5/201 •  melatonin tab TABS 2.5 mg, 2.5 mg, Oral, Nightly **AND** melatonin cap/tab 5 mg, 5 mg, Oral, Nightly  •  sodium chloride 0.9% IV bolus 500 mL, 500 mL, Intravenous, PRN  •  Heparin Sodium (Porcine) 5000 UNIT/ML injection 5,000 Units, 5,000 Units, [place] Extremities: No edema, cyanosis, or clubbing. Skin: Warm and dry. Rectal: Normal perianal areas, no masses felt.   There was not stool to test.  Laboratory Data:  Lab Results   Component Value Date    K 4.4 08/29/2020         Assessment:[AK.1]     Refusal Duodenal ulcers and esophagitis , S/p Biopsy - on PPI  Urinary re tension - resolved  UTI- Enterococuss- finished Rx   Change in MS - Multifactorial   Dementia with behavioral  problems   Hyponatremia - Resolved  COVID positive - on RA    Finished decadron acetaminophen 325 MG Oral Tab  Take 325 mg by mouth every 6 (six) hours as needed for Pain. Saline Nasal Spray 0.65 % Nasal Solution  1 spray by Nasal route as needed for congestion.     THERA-GESIC 1-15 % External Cream  Apply topically 2 (two) times a recommended diet. Will follow up on 9/2/20 as patient's participation warrants. Eliz Aden MA, CCC-SLP  Speech Language Pathologist  Pager# [phone][CJ.1]    Electronically signed by GABE Saavedra on 9/1/2020 10:27 AM   Attribution Marie    CJ. 1

## (undated) NOTE — LETTER
[facility] [address], [address] [place] [address]    Consent for Anesthesia   1.    Marzeenatjohnna Marcus agree to be cared for by an anesthesiologist, who is specially trained to monitor me and give me medicine to put me to sleep or keep me comfortable vision, nerves, or muscles and in extremely rare instances death. 5. My doctor has explained to me other choices available to me for my care (alternatives).   6. Pregnant Patients (“epidural”):  I understand that the risks of having an epidural (medicine g

## (undated) NOTE — IP AVS SNAPSHOT
Patient Demographics     Address  [address] [facility] [address] Phone  [phone] [facility])  [phone] (Mobile) *Preferred* E-mail Address  [email or web address]      Emergency Contact(s)     Name Relation Home Work [address] [facility] E needed for hip patients because of location of incision-don’t want contamination from bathroom use)  ? Continue this until your first visit with your surgeon’s office. ?  There could be a small amount of redness around the staples or incision; this is nor ? As you have less discomfort, decrease the amount of pain medication you take. Use plain Tylenol (acetaminophen) for less severe pain. ? Some pain medications have Tylenol (acetaminophen) in them such as Norco and Percocet.  Do NOT take Tylenol (acetamino ? Continue using incentive spirometry because narcotics make you sleepy so you may not take good deep breaths. We do not want you to get pneumonia. Post op Office visits  ? Schedule 10 days to 3 weeks after surgery WITH SURGEON’s office. ?  Additiona ? Check with you surgeon when you are allowed to travel so you don’t set yourself up for greater chance of complications. ? If traveling by car, get out to stretch every 2 hours. This helps prevent stiffness.   You may need to do this any time you travel TAKE these medications       Instructions Authorizing Provider Morning Afternoon Evening As Needed   acetaminophen 325 MG Tabs  Commonly known as:  TYLENOL      Take 2 tablets (650 mg total) by mouth 4 (four) times daily.    Ann Lentz MD [id number] AmLODIPine Besylate (NORVASC) 5 mg, lisinopril (PRINIVIL,ZESTRIL) 20 mg for Lotrel 5/20 (EE only) 08/11/18 0831 Given      [id number] Entecavir (BARACLUDE) 0.5 MG tablet 0.5 mg 08/11/18 1111 Given      [id number] OLANZapine (ZYPREXA) tab 5 mg 08/ from [facility] 8/3-8/7, who returns post falls x 2 at [facility]. Pt had hip fx post replacement 8/5. Language is barrier, states some pain in leg. Was sleeping.     Past Medical History:   Diagnosis Date   • ANXIETY    • Cataract    • Cataracts /57 (BP Location: Right arm)   Pulse 79   Temp 98.2 °F (36.8 °C) (Oral)   Resp 16   Ht 4' 8\" (1.422 m)   Wt 113 lb 1.5 oz (51.3 kg)   SpO2 96%   BMI 25.36 kg/m²     Gen: NAD, A+O x 3  Neck: supple, no LAD  CV: rrr, +s1/s2, no murmors  Lungs: CTAB, n Near complete opacification involves the right maxillary sinus which contains increased density in internal punctate focus of air may represent highly proteinaceous fluid or hemorrhage given the history of trauma, correlate clinically.     MASTOIDS: PATIENT STATED HISTORY: (As transcribed by Technologist)  Patient offered no additional history at this time. FINDINGS:  Postoperative changes of left hip arthroplasty with hardware in good alignment. Air within the subcutaneous soft tissues.  Surgical s Had CT head/neck at OSH  Ceftriaxone IV for UTI (was initiated at OSH), will need to touch base with [facility] lab during admit to check on urine culture reuslt; UPDATE: e COLI s/t cefalozin  DC on PO keflx     Hx HTN, now hypoTN  [facility] Past Medical History:   Diagnosis Date   • ANXIETY    • Cataract    • Cataracts, bilateral    • Chronic urticaria     has seen dermatology   • CKD (chronic kidney disease) stage 3, GFR 30-59 ml/min ([facility])    • Depression    • Hearing impairment    • Hearing CV: rrr, +s1/s2, no murmors  Lungs: CTAB, no wheezes  Abd: s/nt/nd, +bs  LE: no c/e/e  Neuro: CN 2-12 intact, no focal deficits      LABS:     Lab Results  Component Value Date   WBC 12.2 08/08/2018   HGB 11.6 08/08/2018   HCT 35.0 08/08/2018   .0 0 correlate clinically. MASTOIDS:          No sign of acute inflammation. SKULL:             No evidence for fracture or osseous abnormality. OTHER:             Atherosclerosis. CONCLUSION:  1. No acute intracranial hemorrhage.   2. Findings most co subcutaneous soft tissues. Surgical staples along the lateral soft tissues. No acute fractures. CONCLUSION:  Postoperative changes of left hip arthroplasty with hardware in good alignment.     Dictated by: Prabhu Rivas MD on 8/05/2018 at 16:20 DC on PO keflx     Hx HTN, now hypoTN  Medication held as SBP 80-90s post-op, follow; BP still low 83/45 later in day, will give 1L NS bolus  Hold BP meds for one week  Monitor BP     Hx of Psych issues  Medication ordered  Follow up with PCP     Voice mariel Secondary Diagnoses: None    Risk of readmission:[JS.1] Haley Mcclain[JS.2] has Moderate Risk of readmission after discharge from the hospital.    Discharge Condition: stable    Disposition: leonarda    Important Follow up:  - PCP within   - Consults:[JS.1]     N Take 1 capsule (500 mg total) by mouth 3 (three) times daily. aspirin 325 MG Oral Tab  Take 1 tablet (325 mg total) by mouth 2 (two) times daily. omeprazole 20 MG Oral Capsule Delayed Release  Take 20 mg by mouth daily as needed.     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)     History related to current admission: Per MD notes :81 yo female with multiple medical problems inc Surgeon:  Kathryn Lynn MD;  Location: [address] [place] [address]  1/6/2016: [address] CATARACT EXTRACAP,INSERT LENS Right      Comment: Procedure: RIGHT PHACOEMULSIFICATION OF                CATARACT WITH INTRAOCULAR LENS IMPLANT 032 Gait Assessment   Gait Assistance: Dependent assistance (Based on FIM scale per dept protocol)  Distance (ft): 30  Assistive Device: Rolling walker  Pattern: L Decreased stance time; Shuffle (Narrow abby)  Stoop/Curb Assistance: Not tested  Comment : Above s PT Discharge Recommendations: Sub-acute rehabilitation (ELOS : 19 to 21 days)     PLAN  PT Treatment Plan: Bed mobility; Endurance; Patient education;Gait training;Range of motion;Strengthening;Transfer training;Balance training  Rehab Potential : Good  Freq spine concludes XR finding is artifact with no fracture  Patient underwent  Left Cemented Bipolar Hip Replacement on 08/05/18)  Was transferred to Rehab facility & admitted this time s/p Fall x 2 @ Rehab facility  Problem List  Principal Problem:    Multip L Lower Extremity: Weight Bearing as Tolerated    PAIN ASSESSMENT   Rating: Unable to rate  Location: Left Hip @ surgical site  Management Techniques: Activity promotion; Body mechanics;Breathing techniques;Relaxation;Repositioning    BALANCE sit & sit to stand with RW & max to mod assist of 1. Patient ambulated with RW for 25 ft with mod to max assist & chair closely following. Patient returned to bed @ end of session with max assist.No carry over noted from education provided re: posterior pre Goal #3 Patient is able to ambulate 100 feet with assist device: walker - rolling at assistance level: minimum assistance      Goal #4     Goal #5     Goal #6     Goal Comments: Goals established on 8/9/2018,Ongoing 08/10/18[NP.1]           Attribution Key Past Surgical History:  01/30/2018= Hemorrhoids, :Polyp (HP0, Bx normal: COLONOSCOPY      Comment: Repeat PRN  No date: HIP REPLACEMENT SURGERY  12/16/2015: REMV CATARACT EXTRACAP,INSERT LENS Left      Comment: Procedure: LEFT PHACOEMULSIFICATION OF Lower extremity strength is within functional limits; LLE grossly 2/5, inhibited by pain     BALANCE[HU.1]  Static Sitting: Fair  Dynamic Sitting: Fair -  Static Standing: Poor -  Dynamic Standing: Poor -[HU.2]    ADDITIONAL TESTS 20 feet total with RW, initially max A for standing balance and weight shifting with gait but improved to min A by end of bout. Pt required assist with RW management, cues provided to incr step length with RLE which Pt able to follow through with.  Exhibits PT Treatment Plan: Bed mobility; Endurance; Patient education;Gait training;Range of motion;Strengthening;Transfer training;Balance training  Rehab Potential : Good  Frequency (Obs): Daily  Number of Visits to Meet Established Goals: 5[HU.2]      CURRENT GOA • Chronic urticaria     has seen dermatology   • CKD (chronic kidney disease) stage 3, GFR 30-59 ml/min ([facility])    • Depression    • Hearing impairment    • Hearing loss     left hearing aid   • High blood pressure    • Hip fracture ([facility])    • Unspecified es -   Putting on and taking off regular lower body clothing?: A Lot  -   Bathing (including washing, rinsing, drying)?: A Lot  -   Toileting, which includes using toilet, bedpan or urinal? : A Lot  -   Putting on and taking off regular upper body clothing?: OT Discharge Recommendations: Sub-acute rehabilitation  OT Device Recommendations: TBD    PLAN  OT Treatment Plan: Balance activities; Energy conservation/work simplification techniques;ADL training;IADL training;Continued evaluation; Compensatory technique Multiple falls      Past Medical History  Past Medical History:   Diagnosis Date   • ANXIETY    • Cataract    • Cataracts, bilateral    • Chronic urticaria     has seen dermatology   • CKD (chronic kidney disease) stage 3, GFR 30-59 ml/min ([facility])    • Dep Precautions: KEVIN - posterior;  needed (primarily Mandarin speaking)  Fall Risk: High fall risk    WEIGHT BEARING RESTRICTION  Weight Bearing Restriction: L lower extremity           L Lower Extremity: Weight Bearing as Tolerated    PAIN ASSESSMEN Skilled Therapy Provided: Pt was received supine in bed with family present for translation, pt t/f to EOB with max assist, therapist completed MMT and ROM on pt, pt was able to amb x1 in room and hallway with RW, chair follow and min to mod assist, therap OT Treatment Plan: Balance activities; Energy conservation/work simplification techniques;ADL training;IADL training;Continued evaluation; Compensatory technique education;Equipment eval/education;Patient/Family education;Patient/Family training; Endurance tr